# Patient Record
Sex: MALE | Race: ASIAN | NOT HISPANIC OR LATINO | Employment: PART TIME | URBAN - METROPOLITAN AREA
[De-identification: names, ages, dates, MRNs, and addresses within clinical notes are randomized per-mention and may not be internally consistent; named-entity substitution may affect disease eponyms.]

---

## 2022-04-06 ENCOUNTER — TELEPHONE (OUTPATIENT)
Dept: NEUROSURGERY | Facility: CLINIC | Age: 57
End: 2022-04-06
Payer: MEDICAID

## 2022-04-06 NOTE — TELEPHONE ENCOUNTER
Spoke with patient and confirmed appt date and time with Ivan. Also reiterated that he needs to bring imaging on disk to appointment.

## 2022-04-06 NOTE — TELEPHONE ENCOUNTER
----- Message from Norbert Tejada MA sent at 4/5/2022  2:42 PM CDT -----    ----- Message -----  From: Festus Song  Sent: 4/5/2022   2:03 PM CDT  To: Lizeth BECERRIL Staff    Pt is being referred for lumbar radiculopathy. I have scanned the referral/records into media mgr within Epic. Please review and contact pt for scheduling,thanks

## 2022-04-26 ENCOUNTER — OFFICE VISIT (OUTPATIENT)
Dept: NEUROSURGERY | Facility: CLINIC | Age: 57
End: 2022-04-26
Payer: MEDICAID

## 2022-04-26 VITALS
WEIGHT: 175 LBS | SYSTOLIC BLOOD PRESSURE: 131 MMHG | HEIGHT: 70 IN | HEART RATE: 72 BPM | BODY MASS INDEX: 25.05 KG/M2 | DIASTOLIC BLOOD PRESSURE: 69 MMHG

## 2022-04-26 DIAGNOSIS — M54.16 LUMBAR RADICULOPATHY: Primary | ICD-10-CM

## 2022-04-26 PROCEDURE — 99213 OFFICE O/P EST LOW 20 MIN: CPT | Mod: PBBFAC | Performed by: PHYSICIAN ASSISTANT

## 2022-04-26 PROCEDURE — 3078F DIAST BP <80 MM HG: CPT | Mod: CPTII,,, | Performed by: PHYSICIAN ASSISTANT

## 2022-04-26 PROCEDURE — 99204 PR OFFICE/OUTPT VISIT, NEW, LEVL IV, 45-59 MIN: ICD-10-PCS | Mod: S$PBB,,, | Performed by: PHYSICIAN ASSISTANT

## 2022-04-26 PROCEDURE — 1160F PR REVIEW ALL MEDS BY PRESCRIBER/CLIN PHARMACIST DOCUMENTED: ICD-10-PCS | Mod: CPTII,,, | Performed by: PHYSICIAN ASSISTANT

## 2022-04-26 PROCEDURE — 3075F SYST BP GE 130 - 139MM HG: CPT | Mod: CPTII,,, | Performed by: PHYSICIAN ASSISTANT

## 2022-04-26 PROCEDURE — 99999 PR PBB SHADOW E&M-EST. PATIENT-LVL III: CPT | Mod: PBBFAC,,, | Performed by: PHYSICIAN ASSISTANT

## 2022-04-26 PROCEDURE — 99204 OFFICE O/P NEW MOD 45 MIN: CPT | Mod: S$PBB,,, | Performed by: PHYSICIAN ASSISTANT

## 2022-04-26 PROCEDURE — 4010F PR ACE/ARB THEARPY RXD/TAKEN: ICD-10-PCS | Mod: CPTII,,, | Performed by: PHYSICIAN ASSISTANT

## 2022-04-26 PROCEDURE — 3008F BODY MASS INDEX DOCD: CPT | Mod: CPTII,,, | Performed by: PHYSICIAN ASSISTANT

## 2022-04-26 PROCEDURE — 1159F MED LIST DOCD IN RCRD: CPT | Mod: CPTII,,, | Performed by: PHYSICIAN ASSISTANT

## 2022-04-26 PROCEDURE — 1159F PR MEDICATION LIST DOCUMENTED IN MEDICAL RECORD: ICD-10-PCS | Mod: CPTII,,, | Performed by: PHYSICIAN ASSISTANT

## 2022-04-26 PROCEDURE — 3078F PR MOST RECENT DIASTOLIC BLOOD PRESSURE < 80 MM HG: ICD-10-PCS | Mod: CPTII,,, | Performed by: PHYSICIAN ASSISTANT

## 2022-04-26 PROCEDURE — 3008F PR BODY MASS INDEX (BMI) DOCUMENTED: ICD-10-PCS | Mod: CPTII,,, | Performed by: PHYSICIAN ASSISTANT

## 2022-04-26 PROCEDURE — 3075F PR MOST RECENT SYSTOLIC BLOOD PRESS GE 130-139MM HG: ICD-10-PCS | Mod: CPTII,,, | Performed by: PHYSICIAN ASSISTANT

## 2022-04-26 PROCEDURE — 4010F ACE/ARB THERAPY RXD/TAKEN: CPT | Mod: CPTII,,, | Performed by: PHYSICIAN ASSISTANT

## 2022-04-26 PROCEDURE — 1160F RVW MEDS BY RX/DR IN RCRD: CPT | Mod: CPTII,,, | Performed by: PHYSICIAN ASSISTANT

## 2022-04-26 PROCEDURE — 99999 PR PBB SHADOW E&M-EST. PATIENT-LVL III: ICD-10-PCS | Mod: PBBFAC,,, | Performed by: PHYSICIAN ASSISTANT

## 2022-04-26 RX ORDER — MELOXICAM 15 MG/1
15 TABLET ORAL DAILY
COMMUNITY
Start: 2022-04-01 | End: 2022-10-11 | Stop reason: SDUPTHER

## 2022-04-26 RX ORDER — NAPROXEN 500 MG/1
500 TABLET ORAL 2 TIMES DAILY PRN
COMMUNITY
Start: 2022-01-21 | End: 2022-10-11 | Stop reason: ALTCHOICE

## 2022-04-26 RX ORDER — AMLODIPINE AND BENAZEPRIL HYDROCHLORIDE 10; 20 MG/1; MG/1
1 CAPSULE ORAL DAILY
COMMUNITY
Start: 2022-04-14

## 2022-04-26 RX ORDER — GABAPENTIN 300 MG/1
300 CAPSULE ORAL 3 TIMES DAILY
COMMUNITY
Start: 2022-04-14 | End: 2022-05-17

## 2022-04-26 RX ORDER — ATORVASTATIN CALCIUM 10 MG/1
10 TABLET, FILM COATED ORAL NIGHTLY
COMMUNITY
Start: 2022-04-14

## 2022-04-26 RX ORDER — METHOCARBAMOL 750 MG/1
750 TABLET, FILM COATED ORAL 4 TIMES DAILY PRN
Qty: 40 TABLET | Refills: 0 | Status: SHIPPED | OUTPATIENT
Start: 2022-04-26 | End: 2022-05-17

## 2022-04-26 RX ORDER — TRAMADOL HYDROCHLORIDE 50 MG/1
50 TABLET ORAL 3 TIMES DAILY PRN
Status: ON HOLD | COMMUNITY
Start: 2022-04-01 | End: 2023-04-24 | Stop reason: HOSPADM

## 2022-04-26 NOTE — PROGRESS NOTES
Neurosurgery  History & Physical    SUBJECTIVE:     Chief Complaint: Lumbar disc disease    History of Present Illness:  57-year-old male who presents for neurosurgical consultation and evaluation of his lower back.  Patient states that he has been dealing with back pain and bilateral lower extremity pain and paresthesias that travel down the back of his leg and stops the.  Patient has been treated by his primary care physician he has had a round of physical therapy that has helped some but is not eliminate his back pain or is pain in his legs.  He had lower extremity ultrasound of the bilateral arteries and there was no appreciable ischemic peripheral artery disease.  He has been taking gabapentin 600 mg twice a day and nsaids prn.  Denies any weakness in his lower extremities.  Denies bowel bladder dysfunction.    Review of patient's allergies indicates:  No Known Allergies    Current Outpatient Medications   Medication Sig Dispense Refill    amlodipine-benazepril 10-20mg (LOTREL) 10-20 mg per capsule Take 1 capsule by mouth once daily.      atorvastatin (LIPITOR) 10 MG tablet Take 10 mg by mouth nightly.      gabapentin (NEURONTIN) 300 MG capsule Take 300 mg by mouth 3 (three) times daily.      meloxicam (MOBIC) 15 MG tablet Take 15 mg by mouth once daily.      naproxen (NAPROSYN) 500 MG tablet Take 500 mg by mouth 2 (two) times daily as needed.      traMADoL (ULTRAM) 50 mg tablet Take 50 mg by mouth 3 (three) times daily as needed.      methocarbamoL (ROBAXIN) 750 MG Tab Take 1 tablet (750 mg total) by mouth 4 (four) times daily as needed (muscle spasm). 40 tablet 0     No current facility-administered medications for this visit.       Past Medical History:   Diagnosis Date    Hyperlipidemia     Hypertension      History reviewed. No pertinent surgical history.  Family History    None       Social History     Socioeconomic History    Marital status: Unknown   Tobacco Use    Smoking status: Never  "Smoker    Smokeless tobacco: Never Used       Review of Systems  Constitutional: no fever or chills  Eyes: no visual changes  ENT: no nasal congestion or sore throat  Respiratory: no cough or shortness of breath  Cardiovascular: no chest pain or palpitations  Gastrointestinal: no nausea or vomiting  Genitourinary: no hematuria or dysuria  Integument/Breast: no rash or pruritis  Hematologic/Lymphatic: no easy bruising or lymphadenopathy  Musculoskeletal: no arthralgias or myalgias  Neurological: no seizures or tremors    OBJECTIVE:     Vital Signs  Pulse: 72  BP: 131/69  Pain Score:   4  Height: 5' 10" (177.8 cm)  Weight: 79.4 kg (175 lb)  Body mass index is 25.11 kg/m².      Neurosurgery Physical Exam  General: well developed, well nourished, no distress  Neurologic: Alert and oriented. Thought content appropriate.  GCS: Motor: 6/Verbal: 5/Eyes: 4 GCS Total: 15  Cranial nerves: II-XII grossly intact  Neck: supple, without obvious masses or lesions  Skin: grossly intact in all 4 extremities without obvious rashes or lesions  Gait - normal         Able to walk on heels & toes  Cervical ROM - full  Lumbar ROM - full  Motor Strength: No focal numbness or weakness  Strength  Deltoids Triceps Biceps Wrist Extension Wrist Flexion Hand    Upper: R 5/5 5/5 5/5 5/5 5/5 5/5    L 5/5 5/5 5/5 5/5 5/5 5/5     Iliopsoas Quadriceps Knee  Flexion Tibialis  anterior Gastro- cnemius EHL   Lower: R 5/5 5/5 5/5 5/5 5/5 5/5    L 5/5 5/5 5/5 5/5 5/5 5/5   Sensory: intact to light touch B/L UE and LE  DTR's - 2 + and symmetric in UE and LE  Ankle Clonus - Negative         Babinski  - Negative     SLR - Negative SI Joint tenderness - Negative Pain on Hip ROM - Negative        Diagnostic Results:  Outside x-ray report that was done at [facility] 1/21/[facility] was reviewed today.  Impression showed mild-to-moderate degenerative hypertrophic findings of the lumbar spine including slight retrolisthesis on L2 and L3.  Incidental " findings including slight anterior wedging of the L1 vertebral body and multilevel osteophyte without radiographic evidence of acute compression fracture, spondylolysis or other acute findings within this portion was spine.    ASSESSMENT/PLAN:     58 yo male with with 6 month history of low back pain and bilateral lower extremity paresthesias in the S1 distribution.  X-rays show mild-to-moderate degenerative changes of the lumbar spine with L2-3 retrolisthesis per report.  Patient is neurologically intact without any focal deficits, however he continues to be in significant amount of pain both in his lower back and his lower extremities.  Given this will get an MRI of his lumbar spine.  Patient continue gabapentin 600 mg b.i.d..  Give prescription for Robaxin that he can take as needed for muscle spasms.  Will see him back in the clinic after MRI is complete.  He is encouraged to clinic questions concerns in the meantime.    Jules Love Jr. LAURENT  [facility]  Department of Neurosurgery      Note dictated with voice recognition software, please excuse any grammatical errors.

## 2022-05-17 ENCOUNTER — OFFICE VISIT (OUTPATIENT)
Dept: NEUROSURGERY | Facility: CLINIC | Age: 57
End: 2022-05-17
Payer: MEDICAID

## 2022-05-17 ENCOUNTER — HOSPITAL ENCOUNTER (OUTPATIENT)
Dept: RADIOLOGY | Facility: HOSPITAL | Age: 57
Discharge: HOME | End: 2022-05-17
Attending: PHYSICIAN ASSISTANT
Payer: MEDICAID

## 2022-05-17 VITALS
WEIGHT: 175 LBS | BODY MASS INDEX: 25.05 KG/M2 | SYSTOLIC BLOOD PRESSURE: 124 MMHG | HEART RATE: 80 BPM | HEIGHT: 70 IN | DIASTOLIC BLOOD PRESSURE: 77 MMHG

## 2022-05-17 DIAGNOSIS — M54.16 LUMBAR RADICULOPATHY: ICD-10-CM

## 2022-05-17 DIAGNOSIS — M48.062 LUMBAR STENOSIS WITH NEUROGENIC CLAUDICATION: Primary | ICD-10-CM

## 2022-05-17 PROCEDURE — 99213 PR OFFICE/OUTPT VISIT, EST, LEVL III, 20-29 MIN: ICD-10-PCS | Mod: S$PBB,,, | Performed by: PHYSICIAN ASSISTANT

## 2022-05-17 PROCEDURE — 99999 PR PBB SHADOW E&M-EST. PATIENT-LVL III: CPT | Mod: PBBFAC,,, | Performed by: PHYSICIAN ASSISTANT

## 2022-05-17 PROCEDURE — 3074F SYST BP LT 130 MM HG: CPT | Mod: CPTII,,, | Performed by: PHYSICIAN ASSISTANT

## 2022-05-17 PROCEDURE — 72148 MRI LUMBAR SPINE W/O DYE: CPT | Mod: TC,PO

## 2022-05-17 PROCEDURE — 3008F PR BODY MASS INDEX (BMI) DOCUMENTED: ICD-10-PCS | Mod: CPTII,,, | Performed by: PHYSICIAN ASSISTANT

## 2022-05-17 PROCEDURE — 3078F PR MOST RECENT DIASTOLIC BLOOD PRESSURE < 80 MM HG: ICD-10-PCS | Mod: CPTII,,, | Performed by: PHYSICIAN ASSISTANT

## 2022-05-17 PROCEDURE — 4010F ACE/ARB THERAPY RXD/TAKEN: CPT | Mod: CPTII,,, | Performed by: PHYSICIAN ASSISTANT

## 2022-05-17 PROCEDURE — 4010F PR ACE/ARB THEARPY RXD/TAKEN: ICD-10-PCS | Mod: CPTII,,, | Performed by: PHYSICIAN ASSISTANT

## 2022-05-17 PROCEDURE — 3078F DIAST BP <80 MM HG: CPT | Mod: CPTII,,, | Performed by: PHYSICIAN ASSISTANT

## 2022-05-17 PROCEDURE — 3008F BODY MASS INDEX DOCD: CPT | Mod: CPTII,,, | Performed by: PHYSICIAN ASSISTANT

## 2022-05-17 PROCEDURE — 99213 OFFICE O/P EST LOW 20 MIN: CPT | Mod: PBBFAC,25 | Performed by: PHYSICIAN ASSISTANT

## 2022-05-17 PROCEDURE — 3074F PR MOST RECENT SYSTOLIC BLOOD PRESSURE < 130 MM HG: ICD-10-PCS | Mod: CPTII,,, | Performed by: PHYSICIAN ASSISTANT

## 2022-05-17 PROCEDURE — 99999 PR PBB SHADOW E&M-EST. PATIENT-LVL III: ICD-10-PCS | Mod: PBBFAC,,, | Performed by: PHYSICIAN ASSISTANT

## 2022-05-17 PROCEDURE — 72148 MRI LUMBAR SPINE W/O DYE: CPT | Mod: 26,,, | Performed by: RADIOLOGY

## 2022-05-17 PROCEDURE — 99213 OFFICE O/P EST LOW 20 MIN: CPT | Mod: S$PBB,,, | Performed by: PHYSICIAN ASSISTANT

## 2022-05-17 PROCEDURE — 72148 MRI LUMBAR SPINE WITHOUT CONTRAST: ICD-10-PCS | Mod: 26,,, | Performed by: RADIOLOGY

## 2022-05-17 RX ORDER — GABAPENTIN 800 MG/1
800 TABLET ORAL 3 TIMES DAILY
Qty: 90 TABLET | Refills: 3 | Status: SHIPPED | OUTPATIENT
Start: 2022-05-17 | End: 2022-10-11 | Stop reason: ALTCHOICE

## 2022-05-17 NOTE — PROGRESS NOTES
Neurosurgery  Established Patient    SUBJECTIVE:     Interval History:  Mr. Valverde is here today for f/u with MRI Lumbar spine.  He reports with continued bilateral lower extremity paresthesias down the posterior aspect of his leg as well as that anterior medial aspect.  He states that the symptoms continue to be worse when he is up walking around and subside with rest.  Denies any weakness.  Denies bowel bladder dysfunction.  His back pain overall is minimal at this time.    History of Present Illness (4/26/22):  57-year-old male who presents for neurosurgical consultation and evaluation of his lower back.  Patient states that he has been dealing with back pain and bilateral lower extremity pain and paresthesias that travel down the back of his leg and stops the.  Patient has been treated by his primary care physician he has had a round of physical therapy that has helped some but is not eliminate his back pain or is pain in his legs.  He had lower extremity ultrasound of the bilateral arteries and there was no appreciable ischemic peripheral artery disease.  He has been taking gabapentin 600 mg twice a day and nsaids prn.  Denies any weakness in his lower extremities.  Denies bowel bladder dysfunction.        Review of patient's allergies indicates:  No Known Allergies    Current Outpatient Medications   Medication Sig Dispense Refill    amlodipine-benazepril 10-20mg (LOTREL) 10-20 mg per capsule Take 1 capsule by mouth once daily.      atorvastatin (LIPITOR) 10 MG tablet Take 10 mg by mouth nightly.      meloxicam (MOBIC) 15 MG tablet Take 15 mg by mouth once daily.      traMADoL (ULTRAM) 50 mg tablet Take 50 mg by mouth 3 (three) times daily as needed.      gabapentin (NEURONTIN) 800 MG tablet Take 1 tablet (800 mg total) by mouth 3 (three) times daily. 90 tablet 3    naproxen (NAPROSYN) 500 MG tablet Take 500 mg by mouth 2 (two) times daily as needed.       No current facility-administered medications for  "this visit.       Past Medical History:   Diagnosis Date    Hyperlipidemia     Hypertension      History reviewed. No pertinent surgical history.  Family History    None       Social History     Socioeconomic History    Marital status: Unknown   Tobacco Use    Smoking status: Never Smoker    Smokeless tobacco: Never Used       Review of Systems  Constitutional: no fever or chills  Eyes: no visual changes  ENT: no nasal congestion or sore throat  Respiratory: no cough or shortness of breath  Cardiovascular: no chest pain or palpitations  Gastrointestinal: no nausea or vomiting  Genitourinary: no hematuria or dysuria  Integument/Breast: no rash or pruritis  Hematologic/Lymphatic: no easy bruising or lymphadenopathy  Musculoskeletal: no arthralgias or myalgias  Neurological: no seizures or tremors    OBJECTIVE:     Vital Signs  Pulse: 80  BP: 124/77  Pain Score: 10-Worst pain ever  Height: 5' 10" (177.8 cm)  Weight: 79.4 kg (175 lb)  Body mass index is 25.11 kg/m².    Neurosurgery Physical Exam  General: no distress  Neurologic: Alert and oriented. Thought content appropriate.  Head: normocephalic  GCS: Motor: 6/Verbal: 5/Eyes: 4 GCS Total: 15  Cranial nerves: face symmetric, tongue midline, pupils equal, round, reactive to light with accomodation, extraocular muscles intact  Sensory: response to light touch throughout  Motor Strength:full strength upper and lower extremities  Pronator Drift: no drift noted  Finger to nose normal  No focal numbness or weakness  Lungs:  normal respiratory effort  Abdomen: soft, non-tender   Extremities: no cyanosis or edema, or clubbing      Diagnostic Results: personally reviewed  Narrative & Impression  EXAMINATION:  MRI LUMBAR SPINE WITHOUT CONTRAST     CLINICAL HISTORY:  lumbar radiculopathy; Radiculopathy, lumbar region     TECHNIQUE:  Multiplanar, multisequence MR images were acquired from the thoracolumbar junction to the sacrum without the administration of " contrast.     COMPARISON:  None.     FINDINGS:  Lower dorsal spinal cord normal.  No fracture subluxation.  Marrow space benign.  Bridging anterior osteophytes particularly L1 through L4 disc levels.     Included T11, T12, L1, L2 disc levels show no unusual finding.     L 3 L4 minimal mild posterior disc bulge, prominent facet joint and ligamentum flavum hypertrophy with narrowing of lateral recesses, severe spinal and moderate foramen stenosis.     L4-5 moderate posterior disc bulge, posterior central annular fissure without evidence of superimposed central left paracentral disc prolapse, significant compression narrowing lateral recesses particularly on left, prompt facet joint arthropathy, hypertrophy ligamentum flavum, left synovial facet cyst of 2 x 6 mm size, severe spinal and mild moderate foramen stenosis.     Minimal mild posterior central bulge L5-S1 with posterior central annular fissure, slight compression right S1 nerve root question.  Facet joint arthropathy with mild spinal and foramen stenosis.     Impression:     Degenerative disc, spondylosis, facet joint arthropathy particularly L3 and L4 levels discussed above with severe spinal stenosis.        Electronically signed by: Black Morton MD  Date:                                            05/17/2022  Time:                                           10:29    ASSESSMENT/PLAN:     57-year-old male with disc herniation at L4-5 and central canal stenosis contributing to neurogenic claudication.  Patient is neurologically intact without focal deficits.  He does have continued significant paresthesias in his lower extremities.  He has tried 10 weeks of physical therapy, anti-inflammatories, muscle relaxers, and gabapentin without substantial relief.  Intralaminar epidural steroid injection at L4-5 ordered with pain management at [facility].  Will see him back in clinic after this is done.  If he has any questions or concerns the meantime he is encouraged  to call.      Jules Love Jr. PA-C  [facility]  Department of Neurosurgery      Note dictated with voice recognition software, please excuse any grammatical errors.

## 2022-05-19 ENCOUNTER — TELEPHONE (OUTPATIENT)
Dept: ADMINISTRATIVE | Facility: OTHER | Age: 57
End: 2022-05-19
Payer: MEDICAID

## 2022-05-24 ENCOUNTER — HOSPITAL ENCOUNTER (OUTPATIENT)
Facility: OTHER | Age: 57
Discharge: HOME | End: 2022-05-24
Attending: ANESTHESIOLOGY | Admitting: ANESTHESIOLOGY
Payer: MEDICAID

## 2022-05-24 VITALS
BODY MASS INDEX: 25.92 KG/M2 | RESPIRATION RATE: 16 BRPM | WEIGHT: 175 LBS | TEMPERATURE: 98 F | HEIGHT: 69 IN | DIASTOLIC BLOOD PRESSURE: 75 MMHG | HEART RATE: 56 BPM | SYSTOLIC BLOOD PRESSURE: 137 MMHG | OXYGEN SATURATION: 96 %

## 2022-05-24 DIAGNOSIS — M51.36 DDD (DEGENERATIVE DISC DISEASE), LUMBAR: Primary | ICD-10-CM

## 2022-05-24 DIAGNOSIS — M54.17 LUMBOSACRAL RADICULOPATHY: ICD-10-CM

## 2022-05-24 PROCEDURE — 62323 NJX INTERLAMINAR LMBR/SAC: CPT | Mod: ,,, | Performed by: ANESTHESIOLOGY

## 2022-05-24 PROCEDURE — 25500020 PHARM REV CODE 255: Performed by: ANESTHESIOLOGY

## 2022-05-24 PROCEDURE — A4216 STERILE WATER/SALINE, 10 ML: HCPCS | Performed by: ANESTHESIOLOGY

## 2022-05-24 PROCEDURE — 62323 NJX INTERLAMINAR LMBR/SAC: CPT | Performed by: ANESTHESIOLOGY

## 2022-05-24 PROCEDURE — 62323 PR INJ LUMBAR/SACRAL, W/IMAGING GUIDANCE: ICD-10-PCS | Mod: ,,, | Performed by: ANESTHESIOLOGY

## 2022-05-24 PROCEDURE — 25000003 PHARM REV CODE 250: Performed by: STUDENT IN AN ORGANIZED HEALTH CARE EDUCATION/TRAINING PROGRAM

## 2022-05-24 PROCEDURE — 25000003 PHARM REV CODE 250: Performed by: ANESTHESIOLOGY

## 2022-05-24 PROCEDURE — 63600175 PHARM REV CODE 636 W HCPCS: Performed by: ANESTHESIOLOGY

## 2022-05-24 RX ORDER — DEXAMETHASONE SODIUM PHOSPHATE 10 MG/ML
INJECTION INTRAMUSCULAR; INTRAVENOUS
Status: DISCONTINUED | OUTPATIENT
Start: 2022-05-24 | End: 2022-05-24 | Stop reason: HOSPADM

## 2022-05-24 RX ORDER — SODIUM CHLORIDE 9 MG/ML
INJECTION, SOLUTION INTRAMUSCULAR; INTRAVENOUS; SUBCUTANEOUS
Status: DISCONTINUED | OUTPATIENT
Start: 2022-05-24 | End: 2022-05-24 | Stop reason: HOSPADM

## 2022-05-24 RX ORDER — LIDOCAINE HYDROCHLORIDE 10 MG/ML
INJECTION, SOLUTION EPIDURAL; INFILTRATION; INTRACAUDAL; PERINEURAL
Status: DISCONTINUED | OUTPATIENT
Start: 2022-05-24 | End: 2022-05-24 | Stop reason: HOSPADM

## 2022-05-24 RX ORDER — LIDOCAINE HYDROCHLORIDE 20 MG/ML
INJECTION, SOLUTION INFILTRATION; PERINEURAL
Status: DISCONTINUED | OUTPATIENT
Start: 2022-05-24 | End: 2022-05-24 | Stop reason: HOSPADM

## 2022-05-24 RX ORDER — FENTANYL CITRATE 50 UG/ML
INJECTION, SOLUTION INTRAMUSCULAR; INTRAVENOUS
Status: DISCONTINUED | OUTPATIENT
Start: 2022-05-24 | End: 2022-05-24 | Stop reason: HOSPADM

## 2022-05-24 RX ORDER — MIDAZOLAM HYDROCHLORIDE 1 MG/ML
INJECTION INTRAMUSCULAR; INTRAVENOUS
Status: DISCONTINUED | OUTPATIENT
Start: 2022-05-24 | End: 2022-05-24 | Stop reason: HOSPADM

## 2022-05-24 RX ORDER — SODIUM CHLORIDE 9 MG/ML
500 INJECTION, SOLUTION INTRAVENOUS CONTINUOUS
Status: DISCONTINUED | OUTPATIENT
Start: 2022-05-24 | End: 2022-05-24 | Stop reason: HOSPADM

## 2022-05-24 NOTE — DISCHARGE SUMMARY
[facility] ([facility])  Discharge Note  Short Stay    Procedure(s) (LRB):  INJECTION, STEROID, EPIDURAL, L5/S1 IL CONTRAST DIRECT REF (N/A)    OUTCOME: Patient tolerated treatment/procedure well without complication and is now ready for discharge.    DISPOSITION: Home or Self Care    FINAL DIAGNOSIS:  Lumbar radiculopathy    FOLLOWUP: In clinic    DISCHARGE INSTRUCTIONS:  No discharge procedures on file.     TIME SPENT ON DISCHARGE: 2 minutes

## 2022-05-24 NOTE — H&P
HPI  Patient presenting for Procedure(s) (LRB):  INJECTION, STEROID, EPIDURAL, L4-L5 IL CONTRAST DIRECT REF (N/A)     Patient on Anti-coagulation No    No health changes since previous encounter    Past Medical History:   Diagnosis Date    Hyperlipidemia     Hypertension      No past surgical history on file.  Review of patient's allergies indicates:  No Known Allergies   No current facility-administered medications for this encounter.       PMHx, PSHx, Allergies, Medications reviewed in epic    ROS negative except pain complaints in HPI    OBJECTIVE:    There were no vitals taken for this visit.    PHYSICAL EXAMINATION:    GENERAL: Well appearing, in no acute distress, alert and oriented x3.  PSYCH:  Mood and affect appropriate.  SKIN: Skin color, texture, turgor normal, no rashes or lesions which will impact the procedure.  CV: RRR with palpation of the radial artery.  PULM: No evidence of respiratory difficulty, symmetric chest rise. Clear to auscultation.  NEURO: Cranial nerves grossly intact.    Plan:    Proceed with procedure as planned Procedure(s) (LRB):  INJECTION, STEROID, EPIDURAL, L4-L5 IL CONTRAST DIRECT REF (N/A)    Alvin Moe  05/24/2022

## 2022-05-24 NOTE — OP NOTE
Lumbar Interlaminar Epidural Steroid Injection under Fluoroscopic Guidance    The procedure, risks, benefits, and options were discussed with the patient. There are no contraindications to the procedure. The patent expressed understanding and agreed to the procedure. Informed written consent was obtained prior to the start of the procedure and can be found in the patient's chart.    PATIENT NAME: Mr. Juanita Valverde   MRN: [id number]     DATE OF PROCEDURE: 05/24/2022    PROCEDURE: Lumbar Interlaminar Epidural Steroid Injection L5/S1 under Fluoroscopic Guidance    PRE-OP DIAGNOSIS: Lumbar stenosis with neurogenic claudication [M48.062] Lumbar radiculopathy [M54.16]    POST-OP DIAGNOSIS: Same    PHYSICIAN: Angelica Pedraza MD    ASSISTANTS: none     MEDICATIONS INJECTED: Preservative-free Decadron 10mg with 4cc of Lidocaine 1% MPF and preservative free normal saline    LOCAL ANESTHETIC INJECTED: Xylocaine 2%     ANXIOLYSIS: Versed 1mg and fentanyl 50MCG    ESTIMATED BLOOD LOSS: None    COMPLICATIONS: None    TECHNIQUE: Time-out was performed to identify the patient and procedure to be performed. With the patient laying in a prone position, the surgical area was prepped and draped in the usual sterile fashion using ChloraPrep and a fenestrated drape. The level was determined under fluoroscopy guidance. Skin anesthesia was achieved by injecting Lidocaine 2% over the injection site. The interlaminar space was then approached with a 18 gauge,  3.5 inch Tuohy needle that was introduced under fluoroscopic guidance in the AP, lateral and/or contralateral oblique imaging. Once the Ligamentum flavum was encountered loss of resistance to air was used to enter the epidural space. With positive loss of resistance and negative aspiration for CSF or Blood, contrast dye Omnipaque (240mg/mL) was injected to confirm placement and there was no vascular runoff. 4 mL of the medication mixture listed above was injected slowly. Displacement of  the radio opaque contrast after injection of the medication confirmed that the medication went into the area of the epidural space. The needles were removed and bleeding was nil. A sterile dressing was applied. No specimens collected. The patient tolerated the procedure well.       The patient was monitored after the procedure in the recovery area. They were given post-procedure and discharge instructions to follow at home. The patient was discharged in a stable condition.    Angelica Pedraza MD

## 2022-05-24 NOTE — DISCHARGE INSTRUCTIONS

## 2022-07-12 ENCOUNTER — OFFICE VISIT (OUTPATIENT)
Dept: NEUROSURGERY | Facility: CLINIC | Age: 57
End: 2022-07-12
Payer: MEDICAID

## 2022-07-12 VITALS
HEIGHT: 69 IN | SYSTOLIC BLOOD PRESSURE: 128 MMHG | HEART RATE: 67 BPM | BODY MASS INDEX: 25.92 KG/M2 | WEIGHT: 175 LBS | DIASTOLIC BLOOD PRESSURE: 82 MMHG

## 2022-07-12 DIAGNOSIS — M48.062 LUMBAR STENOSIS WITH NEUROGENIC CLAUDICATION: Primary | ICD-10-CM

## 2022-07-12 PROCEDURE — 3008F PR BODY MASS INDEX (BMI) DOCUMENTED: ICD-10-PCS | Mod: CPTII,,, | Performed by: PHYSICIAN ASSISTANT

## 2022-07-12 PROCEDURE — 4010F PR ACE/ARB THEARPY RXD/TAKEN: ICD-10-PCS | Mod: CPTII,,, | Performed by: PHYSICIAN ASSISTANT

## 2022-07-12 PROCEDURE — 3074F PR MOST RECENT SYSTOLIC BLOOD PRESSURE < 130 MM HG: ICD-10-PCS | Mod: CPTII,,, | Performed by: PHYSICIAN ASSISTANT

## 2022-07-12 PROCEDURE — 1160F RVW MEDS BY RX/DR IN RCRD: CPT | Mod: CPTII,,, | Performed by: PHYSICIAN ASSISTANT

## 2022-07-12 PROCEDURE — 1160F PR REVIEW ALL MEDS BY PRESCRIBER/CLIN PHARMACIST DOCUMENTED: ICD-10-PCS | Mod: CPTII,,, | Performed by: PHYSICIAN ASSISTANT

## 2022-07-12 PROCEDURE — 99213 OFFICE O/P EST LOW 20 MIN: CPT | Mod: S$PBB,,, | Performed by: PHYSICIAN ASSISTANT

## 2022-07-12 PROCEDURE — 99213 PR OFFICE/OUTPT VISIT, EST, LEVL III, 20-29 MIN: ICD-10-PCS | Mod: S$PBB,,, | Performed by: PHYSICIAN ASSISTANT

## 2022-07-12 PROCEDURE — 99213 OFFICE O/P EST LOW 20 MIN: CPT | Mod: PBBFAC | Performed by: PHYSICIAN ASSISTANT

## 2022-07-12 PROCEDURE — 99999 PR PBB SHADOW E&M-EST. PATIENT-LVL III: CPT | Mod: PBBFAC,,, | Performed by: PHYSICIAN ASSISTANT

## 2022-07-12 PROCEDURE — 3079F PR MOST RECENT DIASTOLIC BLOOD PRESSURE 80-89 MM HG: ICD-10-PCS | Mod: CPTII,,, | Performed by: PHYSICIAN ASSISTANT

## 2022-07-12 PROCEDURE — 1159F PR MEDICATION LIST DOCUMENTED IN MEDICAL RECORD: ICD-10-PCS | Mod: CPTII,,, | Performed by: PHYSICIAN ASSISTANT

## 2022-07-12 PROCEDURE — 99999 PR PBB SHADOW E&M-EST. PATIENT-LVL III: ICD-10-PCS | Mod: PBBFAC,,, | Performed by: PHYSICIAN ASSISTANT

## 2022-07-12 PROCEDURE — 3074F SYST BP LT 130 MM HG: CPT | Mod: CPTII,,, | Performed by: PHYSICIAN ASSISTANT

## 2022-07-12 PROCEDURE — 3079F DIAST BP 80-89 MM HG: CPT | Mod: CPTII,,, | Performed by: PHYSICIAN ASSISTANT

## 2022-07-12 PROCEDURE — 4010F ACE/ARB THERAPY RXD/TAKEN: CPT | Mod: CPTII,,, | Performed by: PHYSICIAN ASSISTANT

## 2022-07-12 PROCEDURE — 1159F MED LIST DOCD IN RCRD: CPT | Mod: CPTII,,, | Performed by: PHYSICIAN ASSISTANT

## 2022-07-12 PROCEDURE — 3008F BODY MASS INDEX DOCD: CPT | Mod: CPTII,,, | Performed by: PHYSICIAN ASSISTANT

## 2022-07-12 RX ORDER — TRIAMCINOLONE ACETONIDE 1 MG/G
CREAM TOPICAL 2 TIMES DAILY
COMMUNITY
Start: 2022-05-26 | End: 2023-01-04 | Stop reason: CLARIF

## 2022-07-12 RX ORDER — HYDROCORTISONE 25 MG/G
CREAM TOPICAL 2 TIMES DAILY
COMMUNITY
Start: 2022-05-26 | End: 2023-01-04 | Stop reason: CLARIF

## 2022-07-12 NOTE — PROGRESS NOTES
Neurosurgery  Established Patient    SUBJECTIVE:     Interval History:  Mr. Valverde is here today for follow-up after epidural steroid injection on 05/24/2022.  Patient reports significant improvement in pain and paresthesias of lower extremities, he states approximate 40% improvement.  He feels good about his pain level at this point in time.  He is able to do a lot of the activities that he was unable to do prior to the injection.  He denies any weakness in the lower extremities.  He denies bowel bladder dysfunction.    Interval History(5/17/22):  Mr. Valverde is here today for f/u with MRI Lumbar spine.  He reports with continued bilateral lower extremity paresthesias down the posterior aspect of his leg as well as that anterior medial aspect.  He states that the symptoms continue to be worse when he is up walking around and subside with rest.  Denies any weakness.  Denies bowel bladder dysfunction.  His back pain overall is minimal at this time.     History of Present Illness (4/26/22):  57-year-old male who presents for neurosurgical consultation and evaluation of his lower back.  Patient states that he has been dealing with back pain and bilateral lower extremity pain and paresthesias that travel down the back of his leg and stops the.  Patient has been treated by his primary care physician he has had a round of physical therapy that has helped some but is not eliminate his back pain or is pain in his legs.  He had lower extremity ultrasound of the bilateral arteries and there was no appreciable ischemic peripheral artery disease.  He has been taking gabapentin 600 mg twice a day and nsaids prn.  Denies any weakness in his lower extremities.  Denies bowel bladder dysfunction.      Review of patient's allergies indicates:  No Known Allergies    Current Outpatient Medications   Medication Sig Dispense Refill    amlodipine-benazepril 10-20mg (LOTREL) 10-20 mg per capsule Take 1 capsule by mouth once daily.       "atorvastatin (LIPITOR) 10 MG tablet Take 10 mg by mouth nightly.      gabapentin (NEURONTIN) 800 MG tablet Take 1 tablet (800 mg total) by mouth 3 (three) times daily. 90 tablet 3    hydrocortisone 2.5 % cream Apply topically 2 (two) times daily.      meloxicam (MOBIC) 15 MG tablet Take 15 mg by mouth once daily.      naproxen (NAPROSYN) 500 MG tablet Take 500 mg by mouth 2 (two) times daily as needed.      traMADoL (ULTRAM) 50 mg tablet Take 50 mg by mouth 3 (three) times daily as needed.      triamcinolone acetonide 0.1% (KENALOG) 0.1 % cream Apply topically 2 (two) times daily.       No current facility-administered medications for this visit.       Past Medical History:   Diagnosis Date    Hyperlipidemia     Hypertension      Past Surgical History:   Procedure Laterality Date    EPIDURAL STEROID INJECTION N/A 5/24/2022    Procedure: INJECTION, STEROID, EPIDURAL, L5/S1 IL CONTRAST DIRECT REF;  Surgeon: Angelica Pedraza MD;  Location: [facility];  Service: Pain Management;  Laterality: N/A;     Family History    None       Social History     Socioeconomic History    Marital status: Unknown   Tobacco Use    Smoking status: Never Smoker    Smokeless tobacco: Never Used       Review of Systems  Constitutional: no fever or chills  Eyes: no visual changes  ENT: no nasal congestion or sore throat  Respiratory: no cough or shortness of breath  Cardiovascular: no chest pain or palpitations  Gastrointestinal: no nausea or vomiting  Genitourinary: no hematuria or dysuria  Integument/Breast: no rash or pruritis  Hematologic/Lymphatic: no easy bruising or lymphadenopathy  Musculoskeletal: no arthralgias or myalgias  Neurological: no seizures or tremors    OBJECTIVE:     Vital Signs  Pulse: 67  BP: 128/82  Pain Score:   6  Height: 5' 9" (175.3 cm)  Weight: 79.4 kg (175 lb)  Body mass index is 25.84 kg/m².    Neurosurgery Physical Exam  General: no distress  Neurologic: Alert and oriented. Thought content " appropriate.  Head: normocephalic  GCS: Motor: 6/Verbal: 5/Eyes: 4 GCS Total: 15  Cranial nerves: face symmetric, tongue midline, pupils equal, round, reactive to light with accomodation, extraocular muscles intact  Sensory: response to light touch throughout  Motor Strength:full strength upper and lower extremities  DTRs 2+ and symmetric      Diagnostic Results:  No recent    ASSESSMENT/PLAN:     57-year-old male with disc herniation at L4-5 and central canal stenosis with neurogenic claudication.  Patient marked improvement status post epidural injection approximately since 7 weeks ago.  He is neurologically intact without focal deficits.  Will continue to follow discussed with him if his pain begins to return to give us a call and we can get a repeat injection.  Continue gabapentin he is currently taking 800 mg twice a day.  Will plan on seeing him back in 3 months regardless.  He is encouraged to call the clinic with questions or concerns the meantime.    Jules Love Jr. LAURENT  [facility]  Department of Neurosurgery      Note dictated with voice recognition software, please excuse any grammatical errors.

## 2022-10-11 ENCOUNTER — OFFICE VISIT (OUTPATIENT)
Dept: NEUROSURGERY | Facility: CLINIC | Age: 57
End: 2022-10-11
Payer: MEDICAID

## 2022-10-11 VITALS
HEART RATE: 73 BPM | DIASTOLIC BLOOD PRESSURE: 80 MMHG | HEIGHT: 69 IN | WEIGHT: 175 LBS | BODY MASS INDEX: 25.92 KG/M2 | SYSTOLIC BLOOD PRESSURE: 137 MMHG

## 2022-10-11 DIAGNOSIS — M25.511 RIGHT SHOULDER PAIN, UNSPECIFIED CHRONICITY: ICD-10-CM

## 2022-10-11 DIAGNOSIS — M17.0 PRIMARY OSTEOARTHRITIS OF BOTH KNEES: ICD-10-CM

## 2022-10-11 PROCEDURE — 99213 OFFICE O/P EST LOW 20 MIN: CPT | Mod: PBBFAC | Performed by: PHYSICIAN ASSISTANT

## 2022-10-11 PROCEDURE — 1159F PR MEDICATION LIST DOCUMENTED IN MEDICAL RECORD: ICD-10-PCS | Mod: CPTII,,, | Performed by: PHYSICIAN ASSISTANT

## 2022-10-11 PROCEDURE — 1160F RVW MEDS BY RX/DR IN RCRD: CPT | Mod: CPTII,,, | Performed by: PHYSICIAN ASSISTANT

## 2022-10-11 PROCEDURE — 99214 PR OFFICE/OUTPT VISIT, EST, LEVL IV, 30-39 MIN: ICD-10-PCS | Mod: S$PBB,,, | Performed by: PHYSICIAN ASSISTANT

## 2022-10-11 PROCEDURE — 3075F SYST BP GE 130 - 139MM HG: CPT | Mod: CPTII,,, | Performed by: PHYSICIAN ASSISTANT

## 2022-10-11 PROCEDURE — 3008F BODY MASS INDEX DOCD: CPT | Mod: CPTII,,, | Performed by: PHYSICIAN ASSISTANT

## 2022-10-11 PROCEDURE — 99214 OFFICE O/P EST MOD 30 MIN: CPT | Mod: S$PBB,,, | Performed by: PHYSICIAN ASSISTANT

## 2022-10-11 PROCEDURE — 1159F MED LIST DOCD IN RCRD: CPT | Mod: CPTII,,, | Performed by: PHYSICIAN ASSISTANT

## 2022-10-11 PROCEDURE — 3079F DIAST BP 80-89 MM HG: CPT | Mod: CPTII,,, | Performed by: PHYSICIAN ASSISTANT

## 2022-10-11 PROCEDURE — 3008F PR BODY MASS INDEX (BMI) DOCUMENTED: ICD-10-PCS | Mod: CPTII,,, | Performed by: PHYSICIAN ASSISTANT

## 2022-10-11 PROCEDURE — 99999 PR PBB SHADOW E&M-EST. PATIENT-LVL III: CPT | Mod: PBBFAC,,, | Performed by: PHYSICIAN ASSISTANT

## 2022-10-11 PROCEDURE — 3075F PR MOST RECENT SYSTOLIC BLOOD PRESS GE 130-139MM HG: ICD-10-PCS | Mod: CPTII,,, | Performed by: PHYSICIAN ASSISTANT

## 2022-10-11 PROCEDURE — 3079F PR MOST RECENT DIASTOLIC BLOOD PRESSURE 80-89 MM HG: ICD-10-PCS | Mod: CPTII,,, | Performed by: PHYSICIAN ASSISTANT

## 2022-10-11 PROCEDURE — 1160F PR REVIEW ALL MEDS BY PRESCRIBER/CLIN PHARMACIST DOCUMENTED: ICD-10-PCS | Mod: CPTII,,, | Performed by: PHYSICIAN ASSISTANT

## 2022-10-11 PROCEDURE — 4010F ACE/ARB THERAPY RXD/TAKEN: CPT | Mod: CPTII,,, | Performed by: PHYSICIAN ASSISTANT

## 2022-10-11 PROCEDURE — 99999 PR PBB SHADOW E&M-EST. PATIENT-LVL III: ICD-10-PCS | Mod: PBBFAC,,, | Performed by: PHYSICIAN ASSISTANT

## 2022-10-11 PROCEDURE — 4010F PR ACE/ARB THEARPY RXD/TAKEN: ICD-10-PCS | Mod: CPTII,,, | Performed by: PHYSICIAN ASSISTANT

## 2022-10-11 RX ORDER — GABAPENTIN 300 MG/1
300 CAPSULE ORAL 3 TIMES DAILY
Qty: 90 CAPSULE | Refills: 3 | Status: SHIPPED | OUTPATIENT
Start: 2022-10-11 | End: 2023-06-06 | Stop reason: SDUPTHER

## 2022-10-11 RX ORDER — GABAPENTIN 300 MG/1
300 CAPSULE ORAL 3 TIMES DAILY
COMMUNITY
Start: 2022-08-06 | End: 2022-10-11 | Stop reason: SDUPTHER

## 2022-10-11 RX ORDER — MELOXICAM 15 MG/1
15 TABLET ORAL DAILY PRN
Qty: 30 TABLET | Refills: 0 | Status: SHIPPED | OUTPATIENT
Start: 2022-10-11 | End: 2023-06-06 | Stop reason: SDUPTHER

## 2022-10-11 NOTE — PROGRESS NOTES
Neurosurgery  Established Patient    SUBJECTIVE:     History of Present Illness:  Mr. Valverde is here today for follow up of ongoing bilateral leg pain, L > R. He has not had another injection since last visit.  He reports his leg pain is intermittent, however seems to be occurring more frequently.  Patient also reports noticed some slight foot weakness on the left since last visit.  Denies bowel/bladder dysfunction.  Otherwise no new complaints since last visit.       Interval History(7/12/22):  Mr. Valverde is here today for follow-up after epidural steroid injection on 05/24/2022.  Patient reports significant improvement in pain and paresthesias of lower extremities, he states approximate 40% improvement.  He feels good about his pain level at this point in time.  He is able to do a lot of the activities that he was unable to do prior to the injection.  He denies any weakness in the lower extremities.  He denies bowel bladder dysfunction.     Interval History(5/17/22):  Mr. Valverde is here today for f/u with MRI Lumbar spine.  He reports with continued bilateral lower extremity paresthesias down the posterior aspect of his leg as well as that anterior medial aspect.  He states that the symptoms continue to be worse when he is up walking around and subside with rest.  Denies any weakness.  Denies bowel bladder dysfunction.  His back pain overall is minimal at this time.     History of Present Illness (4/26/22):  57-year-old male who presents for neurosurgical consultation and evaluation of his lower back.  Patient states that he has been dealing with back pain and bilateral lower extremity pain and paresthesias that travel down the back of his leg and stops the.  Patient has been treated by his primary care physician he has had a round of physical therapy that has helped some but is not eliminate his back pain or is pain in his legs.  He had lower extremity ultrasound of the bilateral arteries and there was no appreciable  ischemic peripheral artery disease.  He has been taking gabapentin 600 mg twice a day and nsaids prn.  Denies any weakness in his lower extremities.  Denies bowel bladder dysfunction.    Review of patient's allergies indicates:  No Known Allergies    Current Outpatient Medications   Medication Sig Dispense Refill    amlodipine-benazepril 10-20mg (LOTREL) 10-20 mg per capsule Take 1 capsule by mouth once daily.      atorvastatin (LIPITOR) 10 MG tablet Take 10 mg by mouth nightly.      diclofenac sodium (VOLTAREN) 1 % Gel Apply 2 g topically 3 (three) times daily. 200 g 5    meloxicam (MOBIC) 15 MG tablet Take 1 tablet (15 mg total) by mouth once daily. TAKE WITH FOOD 30 tablet 1    traMADoL (ULTRAM) 50 mg tablet Take 50 mg by mouth 3 (three) times daily as needed.      gabapentin (NEURONTIN) 300 MG capsule Take 1 capsule (300 mg total) by mouth 3 (three) times daily. 90 capsule 3    hydrocortisone 2.5 % cream Apply topically 2 (two) times daily.      meloxicam (MOBIC) 15 MG tablet Take 1 tablet (15 mg total) by mouth daily as needed for Pain (back pain). 30 tablet 0    triamcinolone acetonide 0.1% (KENALOG) 0.1 % cream Apply topically 2 (two) times daily.       No current facility-administered medications for this visit.       Past Medical History:   Diagnosis Date    Hyperlipidemia     Hypertension      Past Surgical History:   Procedure Laterality Date    EPIDURAL STEROID INJECTION N/A 5/24/2022    Procedure: INJECTION, STEROID, EPIDURAL, L5/S1 IL CONTRAST DIRECT REF;  Surgeon: Angelica Pedraza MD;  Location: [facility] PAIN T;  Service: Pain Management;  Laterality: N/A;     Family History    None       Social History     Socioeconomic History    Marital status: Unknown   Tobacco Use    Smoking status: Never    Smokeless tobacco: Never   Substance and Sexual Activity    Alcohol use: Never    Drug use: Never    Sexual activity: Yes     Partners: Male   Social History Narrative    ** Merged History Encounter **       "      Review of Systems  Constitutional: no fever or chills  Eyes: no visual changes  ENT: no nasal congestion or sore throat  Respiratory: no cough or shortness of breath  Cardiovascular: no chest pain or palpitations  Gastrointestinal: no nausea or vomiting  Genitourinary: no hematuria or dysuria  Integument/Breast: no rash or pruritis  Hematologic/Lymphatic: no easy bruising or lymphadenopathy  Musculoskeletal: no arthralgias or myalgias  Neurological: no seizures or tremors    OBJECTIVE:     Vital Signs  Pulse: 73  BP: 137/80  Pain Score:   5  Height: 5' 9" (175.3 cm)  Weight: 79.4 kg (175 lb)  Body mass index is 25.84 kg/m².    Neurosurgery Physical Exam  General: well developed, well nourished, no distress  Neurologic: Alert and oriented. Thought content appropriate.  GCS: Motor: 6/Verbal: 5/Eyes: 4 GCS Total: 15  Cranial nerves: II-XII grossly intact  Neck: supple, without obvious masses or lesions  Skin: grossly intact in all 4 extremities without obvious rashes or lesions  Gait - normal  Tandem Gait - No difficulty         Able to walk on heels & toes  Motor Strength:   Strength  Deltoids Triceps Biceps Wrist Extension Wrist Flexion Hand    Upper: R 5/5 5/5 5/5 5/5 5/5 5/5    L 5/5 5/5 5/5 5/5 5/5 5/5     Iliopsoas Quadriceps Knee  Flexion Tibialis  anterior Gastro- cnemius EHL   Lower: R 5/5 5/5 5/5 5/5 5/5 5/5    L 5/5 5/5 5/5 4+/5 4+/5 4+/5   Sensory: intact to light touch B/L UE and LE  DTR's - 2 + and symmetric in UE and LE  Carroll's - Negative         Ankle Clonus - Negative         Babinski  - Negative         Diagnostic Results: no recent      ASSESSMENT/PLAN:     57-year-old male with disc herniation and synovial cyst at L4-5 resulting central canal stenosis with neurogenic claudication, now with slight left foot weakness compared to right.  Given ongoing symptoms as well as new foot weakness, recommend surgical decompression at L4-5, will have patient follow up with Dr. Monsivais to discuss " surgical options.  In the meantime will order repeat DARIEL, he had a good response to last intralaminar injection at L5-S1.  Refilled gabapentin, taking 300 TID, and mobic prn back pain.  He was encouraged to call the clinic with any questions or concerns in the meantime.       Jules Love Jr. LAURENT  [facility]  Department of Neurosurgery        Note dictated with voice recognition software, please excuse any grammatical errors.

## 2022-10-12 ENCOUNTER — TELEPHONE (OUTPATIENT)
Dept: PAIN MEDICINE | Facility: OTHER | Age: 57
End: 2022-10-12
Payer: MEDICAID

## 2022-10-12 DIAGNOSIS — M48.062 LUMBAR STENOSIS WITH NEUROGENIC CLAUDICATION: Primary | ICD-10-CM

## 2022-10-12 NOTE — TELEPHONE ENCOUNTER
Spoke to patient to schedule direct referral procedure. Patient is scheduled on 10/25/22 at 12:30 PM with Dr. Pedraza. Patient was offered an opportunity to be scheduled in the Pain Management Clinic for a consult with the performing provider before scheduling. Patient declined provider consult. Date, time, and instructions for procedure given to patient. Patient verbalized understanding.

## 2022-10-25 ENCOUNTER — HOSPITAL ENCOUNTER (OUTPATIENT)
Facility: OTHER | Age: 57
Discharge: HOME | End: 2022-10-25
Attending: ANESTHESIOLOGY | Admitting: ANESTHESIOLOGY
Payer: MEDICAID

## 2022-10-25 VITALS
WEIGHT: 175 LBS | TEMPERATURE: 98 F | OXYGEN SATURATION: 97 % | SYSTOLIC BLOOD PRESSURE: 138 MMHG | RESPIRATION RATE: 14 BRPM | HEIGHT: 70 IN | BODY MASS INDEX: 25.05 KG/M2 | HEART RATE: 61 BPM | DIASTOLIC BLOOD PRESSURE: 84 MMHG

## 2022-10-25 DIAGNOSIS — M54.16 LUMBAR RADICULOPATHY: ICD-10-CM

## 2022-10-25 DIAGNOSIS — M51.36 DDD (DEGENERATIVE DISC DISEASE), LUMBAR: Primary | ICD-10-CM

## 2022-10-25 DIAGNOSIS — G89.29 CHRONIC PAIN: ICD-10-CM

## 2022-10-25 PROCEDURE — 25000003 PHARM REV CODE 250: Performed by: ANESTHESIOLOGY

## 2022-10-25 PROCEDURE — 25000003 PHARM REV CODE 250: Performed by: STUDENT IN AN ORGANIZED HEALTH CARE EDUCATION/TRAINING PROGRAM

## 2022-10-25 PROCEDURE — 62323 NJX INTERLAMINAR LMBR/SAC: CPT | Mod: ,,, | Performed by: ANESTHESIOLOGY

## 2022-10-25 PROCEDURE — 63600175 PHARM REV CODE 636 W HCPCS: Performed by: ANESTHESIOLOGY

## 2022-10-25 PROCEDURE — 25500020 PHARM REV CODE 255: Performed by: ANESTHESIOLOGY

## 2022-10-25 PROCEDURE — 62323 NJX INTERLAMINAR LMBR/SAC: CPT | Performed by: ANESTHESIOLOGY

## 2022-10-25 PROCEDURE — 62323 PR INJ LUMBAR/SACRAL, W/IMAGING GUIDANCE: ICD-10-PCS | Mod: ,,, | Performed by: ANESTHESIOLOGY

## 2022-10-25 RX ORDER — MIDAZOLAM HYDROCHLORIDE 1 MG/ML
INJECTION INTRAMUSCULAR; INTRAVENOUS
Status: DISCONTINUED | OUTPATIENT
Start: 2022-10-25 | End: 2022-10-25 | Stop reason: HOSPADM

## 2022-10-25 RX ORDER — LIDOCAINE HYDROCHLORIDE 10 MG/ML
INJECTION, SOLUTION EPIDURAL; INFILTRATION; INTRACAUDAL; PERINEURAL
Status: DISCONTINUED | OUTPATIENT
Start: 2022-10-25 | End: 2022-10-25 | Stop reason: HOSPADM

## 2022-10-25 RX ORDER — LIDOCAINE HYDROCHLORIDE 20 MG/ML
INJECTION, SOLUTION INFILTRATION; PERINEURAL
Status: DISCONTINUED | OUTPATIENT
Start: 2022-10-25 | End: 2022-10-25 | Stop reason: HOSPADM

## 2022-10-25 RX ORDER — SODIUM CHLORIDE 9 MG/ML
500 INJECTION, SOLUTION INTRAVENOUS CONTINUOUS
Status: DISCONTINUED | OUTPATIENT
Start: 2022-10-25 | End: 2022-10-25 | Stop reason: HOSPADM

## 2022-10-25 RX ORDER — DEXAMETHASONE SODIUM PHOSPHATE 10 MG/ML
INJECTION INTRAMUSCULAR; INTRAVENOUS
Status: DISCONTINUED | OUTPATIENT
Start: 2022-10-25 | End: 2022-10-25 | Stop reason: HOSPADM

## 2022-10-25 RX ORDER — FENTANYL CITRATE 50 UG/ML
INJECTION, SOLUTION INTRAMUSCULAR; INTRAVENOUS
Status: DISCONTINUED | OUTPATIENT
Start: 2022-10-25 | End: 2022-10-25 | Stop reason: HOSPADM

## 2022-10-25 NOTE — OP NOTE
Lumbar Interlaminar Epidural Steroid Injection under Fluoroscopic Guidance    The procedure, risks, benefits, and options were discussed with the patient. There are no contraindications to the procedure. The patent expressed understanding and agreed to the procedure. Informed written consent was obtained prior to the start of the procedure and can be found in the patient's chart.    PATIENT NAME: Juanita Valverde   MRN: [id number]     DATE OF PROCEDURE: 10/25/2022    PROCEDURE: Lumbar Interlaminar Epidural Steroid Injection L5/S1 under Fluoroscopic Guidance    PRE-OP DIAGNOSIS: Lumbar stenosis with neurogenic claudication [M48.062] Lumbar radiculopathy [M54.16]    POST-OP DIAGNOSIS: Same    PHYSICIAN: Angelica Pedraza MD    ASSISTANTS: Sam Patricia MD     MEDICATIONS INJECTED: Preservative-free Decadron 10mg with 4cc of Lidocaine 1% MPF and preservative free normal saline    LOCAL ANESTHETIC INJECTED: Xylocaine 2%     SEDATION: Versed 1mg and Fentanyl 50mcg                                                                                                                                                                                     Conscious sedation ordered by M.D. Patient re-evaluation prior to administration of conscious sedation. No changes noted in patient's status from initial evaluation. The patient's vital signs were monitored by RN and patient remained hemodynamically stable throughout the procedure.    Event Time In   Sedation Start 1410   Sedation End 1419       ESTIMATED BLOOD LOSS: None    COMPLICATIONS: None    TECHNIQUE: Time-out was performed to identify the patient and procedure to be performed. With the patient laying in a prone position, the surgical area was prepped and draped in the usual sterile fashion using ChloraPrep and a fenestrated drape. The level was determined under fluoroscopy guidance. Skin anesthesia was achieved by injecting Lidocaine 2% over the injection site. The interlaminar space  was then approached with a 18 gauge,  3.5 inch Tuohy needle that was introduced under fluoroscopic guidance in the AP, lateral and/or contralateral oblique imaging. Once the Ligamentum flavum was encountered loss of resistance to air was used to enter the epidural space. With positive loss of resistance and negative aspiration for CSF or Blood, contrast dye Omnipaque (300mg/mL) was injected to confirm placement and there was no vascular runoff. 5 mL of the medication mixture listed above was injected slowly. Displacement of the radio opaque contrast after injection of the medication confirmed that the medication went into the area of the epidural space. The needles were removed and bleeding was nil. A sterile dressing was applied. No specimens collected. The patient tolerated the procedure well.       The patient was monitored after the procedure in the recovery area. They were given post-procedure and discharge instructions to follow at home. The patient was discharged in a stable condition.      Angelica Pedraza MD

## 2022-10-25 NOTE — DISCHARGE SUMMARY
Discharge Note  Short Stay      SUMMARY     Admit Date: 10/25/2022    Attending Physician: Angelica Pedraza      Discharge Physician: Angelica Pedraza      Discharge Date: 10/25/2022 2:22 PM    Procedure(s) (LRB):  LUMBAR L5/S1 IL DARIEL CONTRAST DIRECT REFERRAL (N/A)    Final Diagnosis: Lumbar stenosis with neurogenic claudication [M48.062]    Disposition: Home or self care    Patient Instructions:   Current Discharge Medication List        CONTINUE these medications which have NOT CHANGED    Details   amlodipine-benazepril 10-20mg (LOTREL) 10-20 mg per capsule Take 1 capsule by mouth once daily.      atorvastatin (LIPITOR) 10 MG tablet Take 10 mg by mouth nightly.      diclofenac sodium (VOLTAREN) 1 % Gel Apply 2 g topically 3 (three) times daily.  Qty: 200 g, Refills: 5    Associated Diagnoses: Primary osteoarthritis of both knees      gabapentin (NEURONTIN) 300 MG capsule Take 1 capsule (300 mg total) by mouth 3 (three) times daily.  Qty: 90 capsule, Refills: 3      hydrocortisone 2.5 % cream Apply topically 2 (two) times daily.      !! meloxicam (MOBIC) 15 MG tablet Take 1 tablet (15 mg total) by mouth once daily. TAKE WITH FOOD  Qty: 30 tablet, Refills: 1    Associated Diagnoses: Right shoulder pain, unspecified chronicity; Primary osteoarthritis of both knees      !! meloxicam (MOBIC) 15 MG tablet Take 1 tablet (15 mg total) by mouth daily as needed for Pain (back pain).  Qty: 30 tablet, Refills: 0      traMADoL (ULTRAM) 50 mg tablet Take 50 mg by mouth 3 (three) times daily as needed.      triamcinolone acetonide 0.1% (KENALOG) 0.1 % cream Apply topically 2 (two) times daily.       !! - Potential duplicate medications found. Please discuss with provider.              Discharge Diagnosis: Lumbar stenosis with neurogenic claudication [M48.062]  Condition on Discharge: Stable with no complications to procedure   Diet on Discharge: Same as before.  Activity: as per instruction sheet.  Discharge to: Home with a  responsible adult.  Follow up: 2-4 weeks       Please call my office or pager at [phone] if experienced any weakness or loss of sensation, fever > 101.5, pain uncontrolled with oral medications, persistent nausea/vomiting/or diarrhea, redness or drainage from the incisions, or any other worrisome concerns. If physician on call was not reached or could not communicate with our office for any reason please go to the nearest emergency department      Angelica Pedraza  10/25/2022

## 2022-10-25 NOTE — DISCHARGE INSTRUCTIONS

## 2022-12-12 DIAGNOSIS — M54.16 LUMBAR RADICULOPATHY: Primary | ICD-10-CM

## 2022-12-20 ENCOUNTER — OFFICE VISIT (OUTPATIENT)
Dept: NEUROSURGERY | Facility: CLINIC | Age: 57
End: 2022-12-20
Payer: MEDICAID

## 2022-12-20 DIAGNOSIS — M54.16 LUMBAR RADICULOPATHY: Primary | ICD-10-CM

## 2022-12-20 PROCEDURE — 1159F MED LIST DOCD IN RCRD: CPT | Mod: CPTII,,, | Performed by: NEUROLOGICAL SURGERY

## 2022-12-20 PROCEDURE — 99999 PR PBB SHADOW E&M-EST. PATIENT-LVL II: ICD-10-PCS | Mod: PBBFAC,,, | Performed by: NEUROLOGICAL SURGERY

## 2022-12-20 PROCEDURE — 99214 OFFICE O/P EST MOD 30 MIN: CPT | Mod: S$PBB,,, | Performed by: NEUROLOGICAL SURGERY

## 2022-12-20 PROCEDURE — 1160F PR REVIEW ALL MEDS BY PRESCRIBER/CLIN PHARMACIST DOCUMENTED: ICD-10-PCS | Mod: CPTII,,, | Performed by: NEUROLOGICAL SURGERY

## 2022-12-20 PROCEDURE — 99212 OFFICE O/P EST SF 10 MIN: CPT | Mod: PBBFAC | Performed by: NEUROLOGICAL SURGERY

## 2022-12-20 PROCEDURE — 1160F RVW MEDS BY RX/DR IN RCRD: CPT | Mod: CPTII,,, | Performed by: NEUROLOGICAL SURGERY

## 2022-12-20 PROCEDURE — 1159F PR MEDICATION LIST DOCUMENTED IN MEDICAL RECORD: ICD-10-PCS | Mod: CPTII,,, | Performed by: NEUROLOGICAL SURGERY

## 2022-12-20 PROCEDURE — 99999 PR PBB SHADOW E&M-EST. PATIENT-LVL II: CPT | Mod: PBBFAC,,, | Performed by: NEUROLOGICAL SURGERY

## 2022-12-20 PROCEDURE — 99214 PR OFFICE/OUTPT VISIT, EST, LEVL IV, 30-39 MIN: ICD-10-PCS | Mod: S$PBB,,, | Performed by: NEUROLOGICAL SURGERY

## 2022-12-20 RX ORDER — GABAPENTIN 800 MG/1
800 TABLET ORAL 3 TIMES DAILY
Status: ON HOLD | COMMUNITY
Start: 2022-10-12 | End: 2023-01-13 | Stop reason: HOSPADM

## 2022-12-20 RX ORDER — ACETAMINOPHEN 325 MG/1
325 TABLET ORAL EVERY 6 HOURS PRN
Status: ON HOLD | COMMUNITY
End: 2023-04-24 | Stop reason: HOSPADM

## 2023-01-03 ENCOUNTER — TELEPHONE (OUTPATIENT)
Dept: PREADMISSION TESTING | Facility: HOSPITAL | Age: 58
End: 2023-01-03
Payer: MEDICAID

## 2023-01-03 DIAGNOSIS — M51.26 HNP (HERNIATED NUCLEUS PULPOSUS), LUMBAR: Primary | ICD-10-CM

## 2023-01-04 ENCOUNTER — TELEPHONE (OUTPATIENT)
Dept: PREADMISSION TESTING | Facility: HOSPITAL | Age: 58
End: 2023-01-04
Payer: MEDICAID

## 2023-01-04 DIAGNOSIS — Z01.818 PREOPERATIVE TESTING: Primary | ICD-10-CM

## 2023-01-04 NOTE — PROGRESS NOTES
Neurosurgery  Established Patient    SUBJECTIVE:     History of Present Illness:  Patient is here to see me for follow-up after last evaluation patient on 10/11/2022.  This is a 57-year-old male with close to a year history of increasing lower back and bilateral leg pain.  He has signs and symptoms of significant neurogenic claudication would also now has noted some increasing weakness of the left foot.  Patient has treated conservatively for physical therapy without any significant improvement.  He is also had anti-inflammatories gabapentin and muscle relaxants.  He is also had 2 epidural injections.  He is only had temporary relief with epidural injections.  Now he is unable to walk for prolonged period time by normal blocker so.  It is having subtle signs of possible left foot weakness.  He denies any bowel bladder issues.    Review of patient's allergies indicates:  No Known Allergies    Current Outpatient Medications   Medication Sig Dispense Refill    acetaminophen (TYLENOL) 325 MG tablet Take 325 mg by mouth every 6 (six) hours as needed for Pain.      amlodipine-benazepril 10-20mg (LOTREL) 10-20 mg per capsule Take 1 capsule by mouth once daily.      atorvastatin (LIPITOR) 10 MG tablet Take 10 mg by mouth nightly.      diclofenac sodium (VOLTAREN) 1 % Gel Apply 2 g topically 3 (three) times daily. 200 g 5    gabapentin (NEURONTIN) 300 MG capsule Take 1 capsule (300 mg total) by mouth 3 (three) times daily. 90 capsule 3    gabapentin (NEURONTIN) 800 MG tablet Take 800 mg by mouth 3 (three) times daily.      traMADoL (ULTRAM) 50 mg tablet Take 50 mg by mouth 3 (three) times daily as needed.      meloxicam (MOBIC) 15 MG tablet Take 1 tablet (15 mg total) by mouth daily as needed for Pain (back pain). 30 tablet 0     No current facility-administered medications for this visit.       Past Medical History:   Diagnosis Date    Hyperlipidemia     Hypertension      Past Surgical History:   Procedure Laterality Date     EPIDURAL STEROID INJECTION N/A 5/24/2022    Procedure: INJECTION, STEROID, EPIDURAL, L5/S1 IL CONTRAST DIRECT REF;  Surgeon: Angelica Pedraza MD;  Location: [facility] PAIN MGT;  Service: Pain Management;  Laterality: N/A;    EPIDURAL STEROID INJECTION N/A 10/25/2022    Procedure: LUMBAR L5/S1 IL DARIEL CONTRAST DIRECT REFERRAL;  Surgeon: Angelica Pedraza MD;  Location: [facility] PAIN [facility]GT;  Service: Pain Management;  Laterality: N/A;     Family History    None       Social History     Socioeconomic History    Marital status: Unknown   Tobacco Use    Smoking status: Never    Smokeless tobacco: Never   Substance and Sexual Activity    Alcohol use: Never    Drug use: Never    Sexual activity: Yes     Partners: Male   Social History Narrative    ** Merged History Encounter **            Review of Systems    OBJECTIVE:     Vital Signs  Pain Score: 10-Worst pain ever  There is no height or weight on file to calculate BMI.    Neurosurgery Physical Exam    Patient is awake alert appropriate  His cranial nerves are intact    He is a positive straight leg raise on the left  He is 5/5 throughout all muscle groups tested except that the left extensor hallucis and tibialis anterior as well as gastrocs appear to be little weak  Discussed decreased sensation left L5 distribution    Diagnostic Results:    MRI scan of lumbar spine  L 3 L4 minimal mild posterior disc bulge, prominent facet joint and ligamentum flavum hypertrophy with narrowing of lateral recesses, severe spinal and moderate foramen stenosis.     L4-5 moderate posterior disc bulge, posterior central annular fissure without evidence of superimposed central left paracentral disc prolapse, significant compression narrowing lateral recesses particularly on left, prompt facet joint arthropathy, hypertrophy ligamentum flavum, left synovial facet cyst of 2 x 6 mm size, severe spinal and mild moderate foramen stenosis.       ASSESSMENT/PLAN:     57-year-old with significant focal  stenosis at L4-5 with a left paracentral disc herniation as well as synovial cyst causing both central and foraminal stenosis.  He is failed conservative management and he is developing motor weakness.  This stage I think he would benefit from surgery.  I would recommend a hemilaminectomy decompression of central canal as was a medial facetectomy and resection of the synovial cyst with microsurgical techniques.    I have discussed the risks/benefits, indications, and alternatives for the proposed procedure in detail. I have answered all of their questions and patient wish to proceed with surgery. We will schedule patient.           Note dictated with voice recognition software, please excuse any grammatical errors.

## 2023-01-04 NOTE — PRE-PROCEDURE INSTRUCTIONS
Used the Italian , Nicolle at [facility] for this phone call. Patient was able to communicate in English with no problem.  stayed on the phone in case would have a language barrier.Patient stated has not had any problem with anesthesia in the past.  He has a poc appt already scheduled for 1/6. Will need labs , ua, and ekg. Our  will call to set up these appts. He said he can read English and uses  the [facility] portal.    Preop instructions given. Hold aspirin, aspirin containing products, nsaids(aleve, advil, motrin, ibuprofen, naprosyn, naproxen, voltaren, diclofenac, Voltaren, Mobic , Meloxicam), vitamins and supplements one week prior to surgery.     May take Tylenol.( Sent to [facility] portal)  Verbalizes understanding.

## 2023-01-04 NOTE — ANESTHESIA PAT ROS NOTE
01/04/2023  Juanita Valverde is a 57 y.o., male.      Pre-op Assessment          Review of Systems         Anesthesia Assessment: Preoperative EQUATION    Planned Procedure: Procedure(s) (LRB):  LAMINECTOMY, SPINE, MINIMALLY INVASIVE L L4-5 (Left)  Requested Anesthesia Type:General  Surgeon: Manny Stanley MD  Service: Neurosurgery  Known or anticipated Date of Surgery:1/12/2023    Surgeon notes: reviewed    Electronic QUestionnaire Assessment completed via nurse interview with patient.        Triage considerations:     The patient has no apparent active cardiac condition (No unstable coronary Syndrome such as severe unstable angina or recent [<1 month] myocardial infarction, decompensated CHF, severe valvular   disease or significant arrhythmia)    Previous anesthesia records:No problems and Not available    Last PCP note: > 1 year ago , outside [facility]   Subspecialty notes: Neurology, Neurosurgery    Other important co-morbidities:PER Epic;  HLD, HTN, and HNP       Tests already available:  Available tests,  6-12 months ago , within [facility] .   5/17/2022 MRI LUMBAR SPINE W/O CONTRAST          Instructions given. (See in Nurse's note)    Optimization:  Anesthesia Preop Clinic Assessment  Indicated          Plan:    Testing:  BMP, EKG, Hematology Profile, PT/INR, PTT, T&S, and UA   Pre-anesthesia  visit       Visit focus:  was requested by surgeon's office        Patient  has previously scheduled Medical Appointment:1/6 poc appt    Navigation: Tests Scheduled. TBD             Consults scheduled.1/6             Results will be tracked by Preop Clinic.  1/11 Labs and UA resulted and noted by Dr. Lopez. EKG resulted and noted by Dr. Chacho Hardy.   Mary Storm RN BSN

## 2023-01-04 NOTE — TELEPHONE ENCOUNTER
----- Message from Mary Storm RN sent at 1/4/2023  9:45 AM CST -----  Surgery 1/12    other 1/6  Please schedule labs, ua, and ekg for 1/6. He understands English fine.  Thanks!

## 2023-01-06 ENCOUNTER — HOSPITAL ENCOUNTER (OUTPATIENT)
Dept: PREADMISSION TESTING | Facility: HOSPITAL | Age: 58
Discharge: HOME | End: 2023-01-06
Attending: ANESTHESIOLOGY
Payer: MEDICAID

## 2023-01-06 ENCOUNTER — ANESTHESIA EVENT (OUTPATIENT)
Dept: SURGERY | Facility: HOSPITAL | Age: 58
DRG: 519 | End: 2023-01-06
Payer: MEDICAID

## 2023-01-06 VITALS
RESPIRATION RATE: 16 BRPM | OXYGEN SATURATION: 97 % | BODY MASS INDEX: 26.53 KG/M2 | DIASTOLIC BLOOD PRESSURE: 77 MMHG | WEIGHT: 179.13 LBS | TEMPERATURE: 98 F | HEIGHT: 69 IN | SYSTOLIC BLOOD PRESSURE: 136 MMHG | HEART RATE: 67 BPM

## 2023-01-06 NOTE — ANESTHESIA PREPROCEDURE EVALUATION
[facility]  Anesthesia Pre-Operative Evaluation         Patient Name: Juanita Valverde  YOB: 1965  MRN: [id number]    SUBJECTIVE:     Pre-operative evaluation for Procedure(s) (LRB):  LAMINECTOMY, SPINE, MINIMALLY INVASIVE L L4-5 (Left)  Scheduled for 1/12/2023    HPI 01/06/2023:  Juanita Valverde is a 57 y.o. male with hx of HTN and HLD. He has had bilateral lumbar and leg pain for around a year, now with symptoms of neurogenic claudication and motor impairment. He has been undergoing conservative management with physical therapy and pain management (on mobic and gabapentin, s/p epidural injection x 2). Now scheduled for above procedure.    He is currently in his usual state of health. He denies recent chest pain, shortness of breath, or leg swelling. Physical activity is greatly limited by his back and leg pain but before he had no problems with activity. He takes lotrel for his BP and is well controlled. Instructed to hold morning of surgery.    Prev airway:   None on file    Oxygen/Ventilation Requirements:  On room air        There is no problem list on file for this patient.      Review of patient's allergies indicates:  No Known Allergies    Outpatient Medications:  Current Outpatient Medications on File Prior to Encounter   Medication Sig Dispense Refill    acetaminophen (TYLENOL) 325 MG tablet Take 325 mg by mouth every 6 (six) hours as needed for Pain.      amlodipine-benazepril 10-20mg (LOTREL) 10-20 mg per capsule Take 1 capsule by mouth once daily.      atorvastatin (LIPITOR) 10 MG tablet Take 10 mg by mouth nightly.      diclofenac sodium (VOLTAREN) 1 % Gel Apply 2 g topically 3 (three) times daily. 200 g 5    gabapentin (NEURONTIN) 300 MG capsule Take 1 capsule (300 mg total) by mouth 3 (three) times daily. 90 capsule 3    gabapentin (NEURONTIN) 800 MG tablet Take 800 mg by mouth 3 (three) times daily.      meloxicam (MOBIC) 15 MG tablet Take 1 tablet (15 mg total) by mouth  daily as needed for Pain (back pain). 30 tablet 0    traMADoL (ULTRAM) 50 mg tablet Take 50 mg by mouth 3 (three) times daily as needed.       No current facility-administered medications on file prior to encounter.        Current Inpatient Medications:      Past Surgical History:   Procedure Laterality Date    EPIDURAL STEROID INJECTION N/A 5/24/2022    Procedure: INJECTION, STEROID, EPIDURAL, L5/S1 IL CONTRAST DIRECT REF;  Surgeon: Angelica Pedraza MD;  Location: [facility]IN [facility];  Service: Pain Management;  Laterality: N/A;    EPIDURAL STEROID INJECTION N/A 10/25/2022    Procedure: LUMBAR L5/S1 IL DARIEL CONTRAST DIRECT REFERRAL;  Surgeon: Angelica Pedraza MD;  Location: [facility]IN [facility];  Service: Pain Management;  Laterality: N/A;       Social History     Socioeconomic History    Marital status: Unknown   Tobacco Use    Smoking status: Never    Smokeless tobacco: Never   Substance and Sexual Activity    Alcohol use: Never    Drug use: Never    Sexual activity: Yes     Partners: Male   Social History Narrative    ** Merged History Encounter **            OBJECTIVE:     Weight:  Wt Readings from Last 1 Encounters:   01/06/23 81.2 kg (179 lb 1.6 oz)     Body mass index is 26.45 kg/m².    Recent Blood Pressure Readings:  BP Readings from Last 3 Encounters:   01/06/23 136/77   10/25/22 138/84   10/11/22 137/80       Vital Signs Range (Last 24H):  Temp:  [36.8 °C (98.2 °F)]   Pulse:  [67]   Resp:  [16]   BP: (136)/(77)   SpO2:  [97 %]       CBC:   Lab Results   Component Value Date    WBC 6.5 10/11/2021    HGB 13.2 10/11/2021    HCT 40.3 10/11/2021    MCV 92 10/11/2021     10/11/2021       CMP:     Chemistry        Component Value Date/Time     10/11/2021 0910    K 4.2 10/11/2021 0910     10/11/2021 0910    CO2 25 10/11/2021 0910    BUN 13 10/11/2021 0910    CREATININE 0.49 (L) 10/11/2021 0910     (H) 10/11/2021 0910        Component Value Date/Time    CALCIUM 9.1 10/11/2021 0910    AST  20 10/11/2021 0910    ALT 14 10/11/2021 0910    BILITOT 0.4 10/11/2021 0910    EGFRNONAA 120 10/11/2021 0910            INR:  No results found for: INR, PROTIME    Diagnostic Studies:      EKG:    No results found for this or any previous visit.    2D Echo:    No results found for this or any previous visit.      ASSESSMENT/PLAN:           Pre-op Assessment    I have reviewed the Patient Summary Reports.          Review of Systems  Anesthesia Hx:  No problems with previous Anesthesia    Social:  Non-Smoker    Cardiovascular:   Hypertension Denies CAD.     Denies Angina. hyperlipidemia    Pulmonary:   Denies COPD.  Denies Asthma.    Hepatic/GI:   Denies PUD. Denies GERD.    Musculoskeletal:  Spine Disorders: lumbar Chronic Pain    Neurological:   Denies CVA.  Denies Headaches.    Endocrine:   Denies Diabetes.  Denies Obesity / BMI > 30  Psych:   Denies Psychiatric History.          Physical Exam  General: Well nourished, Cooperative, Alert and Oriented    Airway:  Mallampati: III / II  Mouth Opening: Normal  TM Distance: Normal  Tongue: Normal  Neck ROM: Normal ROM    Dental:  Intact    Chest/Lungs:  Clear to auscultation, Normal Respiratory Rate        Anesthesia Plan  Type of Anesthesia, risks & benefits discussed:    Anesthesia Type: Gen ETT  Intra-op Monitoring Plan: Standard ASA Monitors  Post Op Pain Control Plan: multimodal analgesia and IV/PO Opioids PRN  Induction:  IV  Airway Plan: Direct and Video, Post-Induction  ASA Score: 2  Day of Surgery Review of History & Physical: H&P Update referred to the surgeon/provider.    Ready For Surgery From Anesthesia Perspective.     .

## 2023-01-09 ENCOUNTER — LAB VISIT (OUTPATIENT)
Dept: LAB | Facility: HOSPITAL | Age: 58
End: 2023-01-09
Attending: ANESTHESIOLOGY
Payer: MEDICAID

## 2023-01-09 DIAGNOSIS — Z01.818 PREOPERATIVE TESTING: ICD-10-CM

## 2023-01-09 DIAGNOSIS — Z01.818 PREOPERATIVE TESTING: Primary | ICD-10-CM

## 2023-01-09 LAB
BILIRUB UR QL STRIP: NEGATIVE
CLARITY UR: CLEAR
COLOR UR: YELLOW
GLUCOSE UR QL STRIP: NEGATIVE
HGB UR QL STRIP: NEGATIVE
KETONES UR QL STRIP: NEGATIVE
LEUKOCYTE ESTERASE UR QL STRIP: NEGATIVE
NITRITE UR QL STRIP: NEGATIVE
PH UR STRIP: 7 [PH] (ref 5–8)
PROT UR QL STRIP: NEGATIVE
SP GR UR STRIP: 1.01 (ref 1–1.03)
URN SPEC COLLECT METH UR: NORMAL
UROBILINOGEN UR STRIP-ACNC: NEGATIVE EU/DL

## 2023-01-09 PROCEDURE — 81003 URINALYSIS AUTO W/O SCOPE: CPT | Performed by: ANESTHESIOLOGY

## 2023-01-10 ENCOUNTER — PATIENT MESSAGE (OUTPATIENT)
Dept: ADMINISTRATIVE | Facility: OTHER | Age: 58
End: 2023-01-10
Payer: MEDICAID

## 2023-01-10 ENCOUNTER — HOSPITAL ENCOUNTER (OUTPATIENT)
Dept: CARDIOLOGY | Facility: HOSPITAL | Age: 58
Discharge: HOME | End: 2023-01-10
Attending: ANESTHESIOLOGY
Payer: MEDICAID

## 2023-01-10 DIAGNOSIS — Z01.818 PREOPERATIVE TESTING: ICD-10-CM

## 2023-01-10 PROCEDURE — 93010 ELECTROCARDIOGRAM REPORT: CPT | Mod: ,,, | Performed by: INTERNAL MEDICINE

## 2023-01-10 PROCEDURE — 93005 ELECTROCARDIOGRAM TRACING: CPT

## 2023-01-10 PROCEDURE — 93010 EKG 12-LEAD: ICD-10-PCS | Mod: ,,, | Performed by: INTERNAL MEDICINE

## 2023-01-11 ENCOUNTER — PATIENT MESSAGE (OUTPATIENT)
Dept: ADMINISTRATIVE | Facility: OTHER | Age: 58
End: 2023-01-11
Payer: MEDICAID

## 2023-01-11 ENCOUNTER — TELEPHONE (OUTPATIENT)
Dept: NEUROSURGERY | Facility: CLINIC | Age: 58
End: 2023-01-11
Payer: MEDICAID

## 2023-01-11 NOTE — TELEPHONE ENCOUNTER
Spoke to Mr Valverde , notified to arrive for 5 am to 2nd floor DOS for surgery. Advised NPO after midnight the night before  procedure , bathe w/ Hibiclens or dial from neck down the night before and morning of  surgery.. Patient verbalized acknowledgement

## 2023-01-12 ENCOUNTER — ANESTHESIA (OUTPATIENT)
Dept: SURGERY | Facility: HOSPITAL | Age: 58
DRG: 519 | End: 2023-01-12
Payer: MEDICAID

## 2023-01-12 ENCOUNTER — HOSPITAL ENCOUNTER (INPATIENT)
Facility: HOSPITAL | Age: 58
LOS: 1 days | Discharge: HOME | DRG: 519 | End: 2023-01-13
Attending: NEUROLOGICAL SURGERY | Admitting: NEUROLOGICAL SURGERY
Payer: MEDICAID

## 2023-01-12 DIAGNOSIS — M51.16 LUMBAR DISC HERNIATION WITH RADICULOPATHY: ICD-10-CM

## 2023-01-12 LAB
ABO + RH BLD: NORMAL
BLD GP AB SCN CELLS X3 SERPL QL: NORMAL

## 2023-01-12 PROCEDURE — 25000003 PHARM REV CODE 250: Performed by: STUDENT IN AN ORGANIZED HEALTH CARE EDUCATION/TRAINING PROGRAM

## 2023-01-12 PROCEDURE — 63600175 PHARM REV CODE 636 W HCPCS: Performed by: STUDENT IN AN ORGANIZED HEALTH CARE EDUCATION/TRAINING PROGRAM

## 2023-01-12 PROCEDURE — 94761 N-INVAS EAR/PLS OXIMETRY MLT: CPT

## 2023-01-12 PROCEDURE — D9220A PRA ANESTHESIA: ICD-10-PCS | Mod: CRNA,,, | Performed by: NURSE ANESTHETIST, CERTIFIED REGISTERED

## 2023-01-12 PROCEDURE — 27000221 HC OXYGEN, UP TO 24 HOURS

## 2023-01-12 PROCEDURE — 25000003 PHARM REV CODE 250: Performed by: NURSE ANESTHETIST, CERTIFIED REGISTERED

## 2023-01-12 PROCEDURE — 25000003 PHARM REV CODE 250: Performed by: NEUROLOGICAL SURGERY

## 2023-01-12 PROCEDURE — 36000711: Performed by: NEUROLOGICAL SURGERY

## 2023-01-12 PROCEDURE — 71000033 HC RECOVERY, INTIAL HOUR: Performed by: NEUROLOGICAL SURGERY

## 2023-01-12 PROCEDURE — 71000039 HC RECOVERY, EACH ADD'L HOUR: Performed by: NEUROLOGICAL SURGERY

## 2023-01-12 PROCEDURE — 25000003 PHARM REV CODE 250: Performed by: ANESTHESIOLOGY

## 2023-01-12 PROCEDURE — 25000003 PHARM REV CODE 250

## 2023-01-12 PROCEDURE — 71000016 HC POSTOP RECOV ADDL HR: Performed by: NEUROLOGICAL SURGERY

## 2023-01-12 PROCEDURE — 63600175 PHARM REV CODE 636 W HCPCS: Performed by: NURSE ANESTHETIST, CERTIFIED REGISTERED

## 2023-01-12 PROCEDURE — 37000009 HC ANESTHESIA EA ADD 15 MINS: Performed by: NEUROLOGICAL SURGERY

## 2023-01-12 PROCEDURE — 63047 LAM FACETEC & FORAMOT LUMBAR: CPT | Mod: ,,, | Performed by: NEUROLOGICAL SURGERY

## 2023-01-12 PROCEDURE — 63600175 PHARM REV CODE 636 W HCPCS: Performed by: NEUROLOGICAL SURGERY

## 2023-01-12 PROCEDURE — D9220A PRA ANESTHESIA: ICD-10-PCS | Mod: ANES,,, | Performed by: ANESTHESIOLOGY

## 2023-01-12 PROCEDURE — 63047 PR LAMINEC/FACETECT/FORAMIN,LUMBAR 1 SEG: ICD-10-PCS | Mod: ,,, | Performed by: NEUROLOGICAL SURGERY

## 2023-01-12 PROCEDURE — 94799 UNLISTED PULMONARY SVC/PX: CPT

## 2023-01-12 PROCEDURE — D9220A PRA ANESTHESIA: Mod: CRNA,,, | Performed by: NURSE ANESTHETIST, CERTIFIED REGISTERED

## 2023-01-12 PROCEDURE — D9220A PRA ANESTHESIA: Mod: ANES,,, | Performed by: ANESTHESIOLOGY

## 2023-01-12 PROCEDURE — 11000001 HC ACUTE MED/SURG PRIVATE ROOM

## 2023-01-12 PROCEDURE — 71000015 HC POSTOP RECOV 1ST HR: Performed by: NEUROLOGICAL SURGERY

## 2023-01-12 PROCEDURE — 99900035 HC TECH TIME PER 15 MIN (STAT)

## 2023-01-12 PROCEDURE — 36000710: Performed by: NEUROLOGICAL SURGERY

## 2023-01-12 PROCEDURE — 37000008 HC ANESTHESIA 1ST 15 MINUTES: Performed by: NEUROLOGICAL SURGERY

## 2023-01-12 PROCEDURE — 63600175 PHARM REV CODE 636 W HCPCS

## 2023-01-12 PROCEDURE — 27201423 OPTIME MED/SURG SUP & DEVICES STERILE SUPPLY: Performed by: NEUROLOGICAL SURGERY

## 2023-01-12 PROCEDURE — 86900 BLOOD TYPING SEROLOGIC ABO: CPT | Performed by: STUDENT IN AN ORGANIZED HEALTH CARE EDUCATION/TRAINING PROGRAM

## 2023-01-12 RX ORDER — SODIUM CHLORIDE 9 MG/ML
INJECTION, SOLUTION INTRAVENOUS CONTINUOUS
Status: DISCONTINUED | OUTPATIENT
Start: 2023-01-12 | End: 2023-01-13

## 2023-01-12 RX ORDER — BUPIVACAINE HYDROCHLORIDE AND EPINEPHRINE 5; 5 MG/ML; UG/ML
INJECTION, SOLUTION EPIDURAL; INTRACAUDAL; PERINEURAL
Status: DISCONTINUED | OUTPATIENT
Start: 2023-01-12 | End: 2023-01-12 | Stop reason: HOSPADM

## 2023-01-12 RX ORDER — MAGNESIUM SULFATE HEPTAHYDRATE 500 MG/ML
INJECTION, SOLUTION INTRAMUSCULAR; INTRAVENOUS
Status: DISCONTINUED | OUTPATIENT
Start: 2023-01-12 | End: 2023-01-12

## 2023-01-12 RX ORDER — METHOCARBAMOL 500 MG/1
500 TABLET, FILM COATED ORAL 4 TIMES DAILY
Status: DISCONTINUED | OUTPATIENT
Start: 2023-01-12 | End: 2023-01-13

## 2023-01-12 RX ORDER — OXYCODONE HYDROCHLORIDE 5 MG/1
5 TABLET ORAL ONCE AS NEEDED
Status: COMPLETED | OUTPATIENT
Start: 2023-01-12 | End: 2023-01-12

## 2023-01-12 RX ORDER — DEXMEDETOMIDINE HYDROCHLORIDE 100 UG/ML
INJECTION, SOLUTION INTRAVENOUS
Status: DISCONTINUED | OUTPATIENT
Start: 2023-01-12 | End: 2023-01-12

## 2023-01-12 RX ORDER — SODIUM CHLORIDE 0.9 % (FLUSH) 0.9 %
10 SYRINGE (ML) INJECTION
Status: DISCONTINUED | OUTPATIENT
Start: 2023-01-12 | End: 2023-01-13 | Stop reason: HOSPADM

## 2023-01-12 RX ORDER — HEPARIN SODIUM 5000 [USP'U]/ML
5000 INJECTION, SOLUTION INTRAVENOUS; SUBCUTANEOUS EVERY 8 HOURS
Status: DISCONTINUED | OUTPATIENT
Start: 2023-01-12 | End: 2023-01-13 | Stop reason: HOSPADM

## 2023-01-12 RX ORDER — PROPOFOL 10 MG/ML
VIAL (ML) INTRAVENOUS
Status: DISCONTINUED | OUTPATIENT
Start: 2023-01-12 | End: 2023-01-12

## 2023-01-12 RX ORDER — DEXAMETHASONE SODIUM PHOSPHATE 4 MG/ML
INJECTION, SOLUTION INTRA-ARTICULAR; INTRALESIONAL; INTRAMUSCULAR; INTRAVENOUS; SOFT TISSUE
Status: DISCONTINUED | OUTPATIENT
Start: 2023-01-12 | End: 2023-01-12

## 2023-01-12 RX ORDER — ACETAMINOPHEN 10 MG/ML
INJECTION, SOLUTION INTRAVENOUS
Status: DISCONTINUED | OUTPATIENT
Start: 2023-01-12 | End: 2023-01-12

## 2023-01-12 RX ORDER — GABAPENTIN 300 MG/1
300 CAPSULE ORAL 3 TIMES DAILY
Status: DISCONTINUED | OUTPATIENT
Start: 2023-01-12 | End: 2023-01-13 | Stop reason: HOSPADM

## 2023-01-12 RX ORDER — BISACODYL 10 MG
10 SUPPOSITORY, RECTAL RECTAL DAILY
Status: DISCONTINUED | OUTPATIENT
Start: 2023-01-12 | End: 2023-01-13

## 2023-01-12 RX ORDER — OXYCODONE HYDROCHLORIDE 5 MG/1
5 TABLET ORAL EVERY 4 HOURS PRN
Status: DISCONTINUED | OUTPATIENT
Start: 2023-01-12 | End: 2023-01-13

## 2023-01-12 RX ORDER — ROCURONIUM BROMIDE 10 MG/ML
INJECTION, SOLUTION INTRAVENOUS
Status: DISCONTINUED | OUTPATIENT
Start: 2023-01-12 | End: 2023-01-12

## 2023-01-12 RX ORDER — HALOPERIDOL 5 MG/ML
0.5 INJECTION INTRAMUSCULAR EVERY 10 MIN PRN
Status: DISCONTINUED | OUTPATIENT
Start: 2023-01-12 | End: 2023-01-12 | Stop reason: HOSPADM

## 2023-01-12 RX ORDER — AMLODIPINE AND BENAZEPRIL HYDROCHLORIDE 5; 20 MG/1; MG/1
1 CAPSULE ORAL DAILY
Status: DISCONTINUED | OUTPATIENT
Start: 2023-01-12 | End: 2023-01-13 | Stop reason: HOSPADM

## 2023-01-12 RX ORDER — ACETAMINOPHEN 500 MG
1000 TABLET ORAL 3 TIMES DAILY
Status: DISCONTINUED | OUTPATIENT
Start: 2023-01-12 | End: 2023-01-13 | Stop reason: HOSPADM

## 2023-01-12 RX ORDER — MORPHINE SULFATE 2 MG/ML
1 INJECTION, SOLUTION INTRAMUSCULAR; INTRAVENOUS
Status: DISCONTINUED | OUTPATIENT
Start: 2023-01-12 | End: 2023-01-13 | Stop reason: HOSPADM

## 2023-01-12 RX ORDER — ONDANSETRON 2 MG/ML
4 INJECTION INTRAMUSCULAR; INTRAVENOUS DAILY PRN
Status: DISCONTINUED | OUTPATIENT
Start: 2023-01-12 | End: 2023-01-12 | Stop reason: HOSPADM

## 2023-01-12 RX ORDER — AMOXICILLIN 250 MG
2 CAPSULE ORAL NIGHTLY PRN
Status: DISCONTINUED | OUTPATIENT
Start: 2023-01-12 | End: 2023-01-13 | Stop reason: HOSPADM

## 2023-01-12 RX ORDER — PROCHLORPERAZINE EDISYLATE 5 MG/ML
5 INJECTION INTRAMUSCULAR; INTRAVENOUS EVERY 6 HOURS PRN
Status: DISCONTINUED | OUTPATIENT
Start: 2023-01-12 | End: 2023-01-13 | Stop reason: HOSPADM

## 2023-01-12 RX ORDER — FENTANYL CITRATE 50 UG/ML
25 INJECTION, SOLUTION INTRAMUSCULAR; INTRAVENOUS EVERY 5 MIN PRN
Status: DISCONTINUED | OUTPATIENT
Start: 2023-01-12 | End: 2023-01-12

## 2023-01-12 RX ORDER — ONDANSETRON 8 MG/1
8 TABLET, ORALLY DISINTEGRATING ORAL EVERY 6 HOURS PRN
Status: DISCONTINUED | OUTPATIENT
Start: 2023-01-12 | End: 2023-01-13 | Stop reason: HOSPADM

## 2023-01-12 RX ORDER — MEPERIDINE HYDROCHLORIDE 50 MG/ML
12.5 INJECTION INTRAMUSCULAR; INTRAVENOUS; SUBCUTANEOUS EVERY 10 MIN PRN
Status: DISCONTINUED | OUTPATIENT
Start: 2023-01-12 | End: 2023-01-12 | Stop reason: HOSPADM

## 2023-01-12 RX ORDER — FENTANYL CITRATE 50 UG/ML
INJECTION, SOLUTION INTRAMUSCULAR; INTRAVENOUS
Status: DISCONTINUED | OUTPATIENT
Start: 2023-01-12 | End: 2023-01-12

## 2023-01-12 RX ORDER — METHYLPREDNISOLONE ACETATE 40 MG/ML
INJECTION, SUSPENSION INTRA-ARTICULAR; INTRALESIONAL; INTRAMUSCULAR; SOFT TISSUE
Status: DISCONTINUED | OUTPATIENT
Start: 2023-01-12 | End: 2023-01-12 | Stop reason: HOSPADM

## 2023-01-12 RX ORDER — FENTANYL CITRATE 50 UG/ML
25 INJECTION, SOLUTION INTRAMUSCULAR; INTRAVENOUS EVERY 5 MIN PRN
Status: DISCONTINUED | OUTPATIENT
Start: 2023-01-12 | End: 2023-01-12 | Stop reason: HOSPADM

## 2023-01-12 RX ORDER — MUPIROCIN 20 MG/G
OINTMENT TOPICAL 2 TIMES DAILY
Status: DISCONTINUED | OUTPATIENT
Start: 2023-01-12 | End: 2023-01-13 | Stop reason: HOSPADM

## 2023-01-12 RX ORDER — MIDAZOLAM HYDROCHLORIDE 1 MG/ML
INJECTION INTRAMUSCULAR; INTRAVENOUS
Status: DISCONTINUED | OUTPATIENT
Start: 2023-01-12 | End: 2023-01-12

## 2023-01-12 RX ORDER — HALOPERIDOL 5 MG/ML
0.5 INJECTION INTRAMUSCULAR EVERY 10 MIN PRN
Status: DISCONTINUED | OUTPATIENT
Start: 2023-01-12 | End: 2023-01-12

## 2023-01-12 RX ORDER — HYDROMORPHONE HYDROCHLORIDE 1 MG/ML
0.2 INJECTION, SOLUTION INTRAMUSCULAR; INTRAVENOUS; SUBCUTANEOUS EVERY 5 MIN PRN
Status: DISCONTINUED | OUTPATIENT
Start: 2023-01-12 | End: 2023-01-12

## 2023-01-12 RX ORDER — ONDANSETRON 2 MG/ML
INJECTION INTRAMUSCULAR; INTRAVENOUS
Status: DISCONTINUED | OUTPATIENT
Start: 2023-01-12 | End: 2023-01-12

## 2023-01-12 RX ORDER — MAG HYDROX/ALUMINUM HYD/SIMETH 200-200-20
30 SUSPENSION, ORAL (FINAL DOSE FORM) ORAL EVERY 4 HOURS PRN
Status: DISCONTINUED | OUTPATIENT
Start: 2023-01-12 | End: 2023-01-13 | Stop reason: HOSPADM

## 2023-01-12 RX ORDER — IBUPROFEN 400 MG/1
400 TABLET ORAL EVERY 6 HOURS PRN
Status: DISCONTINUED | OUTPATIENT
Start: 2023-01-12 | End: 2023-01-13 | Stop reason: HOSPADM

## 2023-01-12 RX ORDER — HYDROMORPHONE HYDROCHLORIDE 1 MG/ML
0.2 INJECTION, SOLUTION INTRAMUSCULAR; INTRAVENOUS; SUBCUTANEOUS EVERY 10 MIN PRN
Status: DISCONTINUED | OUTPATIENT
Start: 2023-01-12 | End: 2023-01-12 | Stop reason: HOSPADM

## 2023-01-12 RX ORDER — LIDOCAINE HCL/PF 100 MG/5ML
SYRINGE (ML) INTRAVENOUS
Status: DISCONTINUED | OUTPATIENT
Start: 2023-01-12 | End: 2023-01-12

## 2023-01-12 RX ADMIN — HEPARIN SODIUM 5000 UNITS: 5000 INJECTION INTRAVENOUS; SUBCUTANEOUS at 03:01

## 2023-01-12 RX ADMIN — DEXMEDETOMIDINE HYDROCHLORIDE 20 MCG: 100 INJECTION, SOLUTION INTRAVENOUS at 09:01

## 2023-01-12 RX ADMIN — METHOCARBAMOL 500 MG: 500 TABLET ORAL at 09:01

## 2023-01-12 RX ADMIN — FENTANYL CITRATE 25 MCG: 50 INJECTION INTRAMUSCULAR; INTRAVENOUS at 12:01

## 2023-01-12 RX ADMIN — GABAPENTIN 300 MG: 300 CAPSULE ORAL at 03:01

## 2023-01-12 RX ADMIN — METHOCARBAMOL 500 MG: 500 TABLET ORAL at 05:01

## 2023-01-12 RX ADMIN — OXYCODONE HYDROCHLORIDE 5 MG: 5 TABLET ORAL at 11:01

## 2023-01-12 RX ADMIN — SUGAMMADEX 200 MG: 100 INJECTION, SOLUTION INTRAVENOUS at 08:01

## 2023-01-12 RX ADMIN — MIDAZOLAM HYDROCHLORIDE 2 MG: 1 INJECTION, SOLUTION INTRAMUSCULAR; INTRAVENOUS at 07:01

## 2023-01-12 RX ADMIN — METHOCARBAMOL 500 MG: 500 TABLET ORAL at 12:01

## 2023-01-12 RX ADMIN — OXYCODONE HYDROCHLORIDE 5 MG: 5 TABLET ORAL at 09:01

## 2023-01-12 RX ADMIN — ACETAMINOPHEN 1000 MG: 500 TABLET ORAL at 09:01

## 2023-01-12 RX ADMIN — CEFAZOLIN 2 G: 2 INJECTION, POWDER, FOR SOLUTION INTRAMUSCULAR; INTRAVENOUS at 07:01

## 2023-01-12 RX ADMIN — LIDOCAINE HYDROCHLORIDE 100 MG: 20 INJECTION, SOLUTION INTRAVENOUS at 07:01

## 2023-01-12 RX ADMIN — PROPOFOL 160 MG: 10 INJECTION, EMULSION INTRAVENOUS at 07:01

## 2023-01-12 RX ADMIN — ACETAMINOPHEN 1000 MG: 10 INJECTION INTRAVENOUS at 08:01

## 2023-01-12 RX ADMIN — SODIUM CHLORIDE: 0.9 INJECTION, SOLUTION INTRAVENOUS at 06:01

## 2023-01-12 RX ADMIN — ONDANSETRON 4 MG: 2 INJECTION INTRAMUSCULAR; INTRAVENOUS at 09:01

## 2023-01-12 RX ADMIN — CEFAZOLIN 2 G: 2 INJECTION, POWDER, FOR SOLUTION INTRAMUSCULAR; INTRAVENOUS at 03:01

## 2023-01-12 RX ADMIN — MUPIROCIN: 20 OINTMENT TOPICAL at 09:01

## 2023-01-12 RX ADMIN — MAGNESIUM SULFATE HEPTAHYDRATE 500 MG: 500 INJECTION, SOLUTION INTRAMUSCULAR; INTRAVENOUS at 09:01

## 2023-01-12 RX ADMIN — MAGNESIUM SULFATE HEPTAHYDRATE 500 MG: 500 INJECTION, SOLUTION INTRAMUSCULAR; INTRAVENOUS at 08:01

## 2023-01-12 RX ADMIN — FENTANYL CITRATE 50 MCG: 0.05 INJECTION, SOLUTION INTRAMUSCULAR; INTRAVENOUS at 07:01

## 2023-01-12 RX ADMIN — DEXAMETHASONE SODIUM PHOSPHATE 4 MG: 4 INJECTION, SOLUTION INTRAMUSCULAR; INTRAVENOUS at 07:01

## 2023-01-12 RX ADMIN — SODIUM CHLORIDE: 9 INJECTION, SOLUTION INTRAVENOUS at 10:01

## 2023-01-12 RX ADMIN — MUPIROCIN: 20 OINTMENT TOPICAL at 11:01

## 2023-01-12 RX ADMIN — GABAPENTIN 300 MG: 300 CAPSULE ORAL at 09:01

## 2023-01-12 RX ADMIN — PROPOFOL 40 MG: 10 INJECTION, EMULSION INTRAVENOUS at 09:01

## 2023-01-12 RX ADMIN — SODIUM CHLORIDE, SODIUM GLUCONATE, SODIUM ACETATE, POTASSIUM CHLORIDE, MAGNESIUM CHLORIDE, SODIUM PHOSPHATE, DIBASIC, AND POTASSIUM PHOSPHATE: .53; .5; .37; .037; .03; .012; .00082 INJECTION, SOLUTION INTRAVENOUS at 09:01

## 2023-01-12 RX ADMIN — HEPARIN SODIUM 5000 UNITS: 5000 INJECTION INTRAVENOUS; SUBCUTANEOUS at 09:01

## 2023-01-12 RX ADMIN — ROCURONIUM BROMIDE 40 MG: 10 INJECTION INTRAVENOUS at 07:01

## 2023-01-12 RX ADMIN — ACETAMINOPHEN 1000 MG: 500 TABLET ORAL at 03:01

## 2023-01-12 NOTE — PROGRESS NOTES
Chart reviewed, and pre-op nursing care complete per orders. Safe surgery checklist complete. Friend @ bedside, pt belongings given to her. Waiting for anesthesia consent, site-marking, and full H&P prior to surgery. Pt fluent in English, no  needed. Pt relaxing in room with lights dimmed, TV on, call light within reach.

## 2023-01-12 NOTE — TRANSFER OF CARE
"Anesthesia Transfer of Care Note    Patient: Juanita Valverde    Procedure(s) Performed: Procedure(s) (LRB):  LAMINECTOMY, SPINE, MINIMALLY INVASIVE L L4-5 (Left)    Patient location: PACU    Anesthesia Type: general    Transport from OR: Transported from OR on 6-10 L/min O2 by face mask with adequate spontaneous ventilation    Post pain: adequate analgesia    Post assessment: no apparent anesthetic complications and tolerated procedure well    Post vital signs: stable    Level of consciousness: sedated    Nausea/Vomiting: no nausea/vomiting    Complications: none    Transfer of care protocol was followed      Last vitals:   Visit Vitals  /75 (BP Location: Left arm, Patient Position: Lying)   Pulse 78   Temp 36.1 °C (97 °F) (Temporal)   Resp 18   Ht 5' 9" (1.753 m)   Wt 81.2 kg (179 lb 0.2 oz)   SpO2 100%   BMI 26.44 kg/m²     "
Attending Ferrer: 68 y/o male h/o MS who is nonambulatory presenting with subjective fevers and intermittent abdominal pain since november. reviewed old records, pt with h/o gallstones in the past. POCUS limited evaluation of the gallbladder with sig distended bladder and right sided hydroneprhosis. will place barton catheter and check UA. afebrile in the ed. upon arrival pt mildly tachycardic. no sob or pleuritic pain to suggest PE, however pt is nonambulatory making him at risk for PE. will send for radiology ultrasound of the gallbladder, check UA and further infectious work up and given IVF. pt will need admission, consider GI eval.

## 2023-01-12 NOTE — H&P
Please see clinic note from Dr. Stanley. Agree with assessment and plan.     Jihan Zavala  NSGY PGY2      Neurosurgery  Established Patient     SUBJECTIVE:      History of Present Illness:  Patient is here to see me for follow-up after last evaluation patient on 10/11/2022.  This is a 57-year-old male with close to a year history of increasing lower back and bilateral leg pain.  He has signs and symptoms of significant neurogenic claudication would also now has noted some increasing weakness of the left foot.  Patient has treated conservatively for physical therapy without any significant improvement.  He is also had anti-inflammatories gabapentin and muscle relaxants.  He is also had 2 epidural injections.  He is only had temporary relief with epidural injections.  Now he is unable to walk for prolonged period time by normal blocker so.  It is having subtle signs of possible left foot weakness.  He denies any bowel bladder issues.     Review of patient's allergies indicates:  No Known Allergies     Current Medications          Current Outpatient Medications   Medication Sig Dispense Refill    acetaminophen (TYLENOL) 325 MG tablet Take 325 mg by mouth every 6 (six) hours as needed for Pain.        amlodipine-benazepril 10-20mg (LOTREL) 10-20 mg per capsule Take 1 capsule by mouth once daily.        atorvastatin (LIPITOR) 10 MG tablet Take 10 mg by mouth nightly.        diclofenac sodium (VOLTAREN) 1 % Gel Apply 2 g topically 3 (three) times daily. 200 g 5    gabapentin (NEURONTIN) 300 MG capsule Take 1 capsule (300 mg total) by mouth 3 (three) times daily. 90 capsule 3    gabapentin (NEURONTIN) 800 MG tablet Take 800 mg by mouth 3 (three) times daily.        traMADoL (ULTRAM) 50 mg tablet Take 50 mg by mouth 3 (three) times daily as needed.        meloxicam (MOBIC) 15 MG tablet Take 1 tablet (15 mg total) by mouth daily as needed for Pain (back pain). 30 tablet 0      No current facility-administered medications for  this visit.                 Past Medical History:   Diagnosis Date    Hyperlipidemia      Hypertension              Past Surgical History:   Procedure Laterality Date    EPIDURAL STEROID INJECTION N/A 5/24/2022     Procedure: INJECTION, STEROID, EPIDURAL, L5/S1 IL CONTRAST DIRECT REF;  Surgeon: Angelica Pedraza MD;  Location: [facility]IN [facility]GT;  Service: Pain Management;  Laterality: N/A;    EPIDURAL STEROID INJECTION N/A 10/25/2022     Procedure: LUMBAR L5/S1 IL DARIEL CONTRAST DIRECT REFERRAL;  Surgeon: Angelica Pedraza MD;  Location: [facility];  Service: Pain Management;  Laterality: N/A;      Family History    None         Social History            Socioeconomic History    Marital status: Unknown   Tobacco Use    Smoking status: Never    Smokeless tobacco: Never   Substance and Sexual Activity    Alcohol use: Never    Drug use: Never    Sexual activity: Yes       Partners: Male   Social History Narrative     ** Merged History Encounter **               Review of Systems     OBJECTIVE:      Vital Signs  Pain Score: 10-Worst pain ever  There is no height or weight on file to calculate BMI.     Neurosurgery Physical Exam     Patient is awake alert appropriate  His cranial nerves are intact     He is a positive straight leg raise on the left  He is 5/5 throughout all muscle groups tested except that the left extensor hallucis and tibialis anterior as well as gastrocs appear to be little weak  Discussed decreased sensation left L5 distribution     Diagnostic Results:     MRI scan of lumbar spine  L 3 L4 minimal mild posterior disc bulge, prominent facet joint and ligamentum flavum hypertrophy with narrowing of lateral recesses, severe spinal and moderate foramen stenosis.     L4-5 moderate posterior disc bulge, posterior central annular fissure without evidence of superimposed central left paracentral disc prolapse, significant compression narrowing lateral recesses particularly on left, prompt facet joint  arthropathy, hypertrophy ligamentum flavum, left synovial facet cyst of 2 x 6 mm size, severe spinal and mild moderate foramen stenosis.        ASSESSMENT/PLAN:      57-year-old with significant focal stenosis at L4-5 with a left paracentral disc herniation as well as synovial cyst causing both central and foraminal stenosis.  He is failed conservative management and he is developing motor weakness.  This stage I think he would benefit from surgery.  I would recommend a hemilaminectomy decompression of central canal as was a medial facetectomy and resection of the synovial cyst with microsurgical techniques.     I have discussed the risks/benefits, indications, and alternatives for the proposed procedure in detail. I have answered all of their questions and patient wish to proceed with surgery. We will schedule patient.               Note dictated with voice recognition software, please excuse any grammatical errors.            Electronically signed by Manny Stanley MD at 1/4/2023 11:19 AM

## 2023-01-12 NOTE — BRIEF OP NOTE
[facility] - Surgery (2nd Fl)  Brief Operative Note    SUMMARY     Surgery Date: 1/12/2023     Surgeon(s) and Role:     * Manny Stanley MD - Primary     * Jihan Zavala MD - Resident - Assisting     * Dominick Fabian MD - Resident - Assisting        Pre-op Diagnosis:  HNP (herniated nucleus pulposus), lumbar [M51.26]    Post-op Diagnosis:  Post-Op Diagnosis Codes:     * HNP (herniated nucleus pulposus), lumbar [M51.26]    Procedure(s) (LRB):  LAMINECTOMY, SPINE, MINIMALLY INVASIVE L L4-5 (Left)    Anesthesia: General    Operative Findings: L4/5 MIS laminectomy/discectomy & L L4/5 synovial cyst resection    Estimated Blood Loss: 20cc         Specimens:   Specimen (24h ago, onward)      None            [id number]

## 2023-01-12 NOTE — OP NOTE
[facility] ([facility])  Neurosurgery  Operative Note    OP Note      Date of Procedure: 1/12/2023       Pre-Operative Diagnosis: HNP (herniated nucleus pulposus), lumbar [M51.26] and lumbar stenosis    Post-Operative Diagnosis: Post-Op Diagnosis Codes:     * HNP (herniated nucleus pulposus), lumbar [M51.26]  and lumbar stenosis    Anesthesia: General    Procedures performed:1.  Minimally invasive bilateral decompressive laminectomy for central stenosis decompression 2.  Left-sided medial facetectomy foraminotomy and microdiskectomy 3. Use of microsurgical technique      Surgeon: Manny Stanley MD    Assistant:: Dominick Fabian MD  and Jihan Zavala MD    Indication for Procedure:  This is a 58-year-old male with clinical evidence of neurogenic claudication from central stenosis as well as left-sided radiculopathy from a paracentral disc herniation.  Patient failed conservative management so we felt patient would benefit from surgical intervention    Operative Note:  Patient was anesthetized intubated by anesthesia.  Was placed in a prone position.  Back was prepped and draped sterile fashion.  Fluoroscopy was used to localize the appropriate level.  A 2 cm left paramedian skin incision was made over the disc space of L4-L5.  We used a 18 mm metrics tube system dilated to 18 mm 5. Tube.  This was parked over the inferior aspect of the L4 lamina on the left.  Microscope was brought in the field under microsurgical techniques soft tissue was removed and then a high-speed drill was used to perform ipsilateral laminectomy and then drilled across the contralateral laminectomy took down ligamentum flavum to be able to perform a central decompression we then performed a medial facetectomy on the left side after the central decompression and thecal sac was performed however upon completion of the medial facetectomy and foraminotomy notice some clear fluid coming from the superior edge of the of the dura.  Pushing dura back  we saw a small linear sharp durotomy on the dorsal aspect of the dura.  We used a six 0 [place]-Oswald suture to close it primarily with a figure-eight stitch.  Once we are happy with the dura repair we placed some Surgicel on top of that and then mobilized laterally turned our attention to the foramen we performed a foraminotomy followed out the disc space out laterally.  We are able to mobilize the thecal sac and nerve root medially we coagulated epidural venous bleeding then found the 4 5 disc space.  We then stimulated to make sure there was no nerve roots in the area then used a 15 blade to get into the disc annulus then we used microsurgical technique with downgoing curettes pituitaries and Kerrisons to form a micro diskectomy once were happy with the microdiskectomy we then were able to thoroughly decompress the lateral gutters.  Once were happy with this we coagulated the annulus placed some FloSeal Depo-Medrol and exposed nerve root remove the metrics tube and closed the wound primarily.  Neuro monitor was stable throughout the case.  Dermabond glue and a sterile dressing put in place.  Patient was extubated brought to recovery room without any problems or complication.    EBL:  Minimal  Specimen Sent:  Disc material

## 2023-01-12 NOTE — ANESTHESIA PROCEDURE NOTES
Intubation    Date/Time: 1/12/2023 7:23 AM  Performed by: Renzo Keller CRNA  Authorized by: Conrado Pena MD     Intubation:     Induction:  Intravenous    Intubated:  Postinduction    Mask Ventilation:  Easy mask    Attempts:  1    Attempted By:  CRNA    Method of Intubation:  Direct    Blade:  Olsen 2    Laryngeal View Grade: Grade I - full view of cords      Difficult Airway Encountered?: No      Complications:  None    Airway Device:  Oral endotracheal tube    Airway Device Size:  7.5    Style/Cuff Inflation:  Cuffed (inflated to minimal occlusive pressure)    Tube secured:  22    Secured at:  The lips    Placement Verified By:  Capnometry    Complicating Factors:  None    Findings Post-Intubation:  BS equal bilateral and atraumatic/condition of teeth unchanged

## 2023-01-12 NOTE — ANESTHESIA POSTPROCEDURE EVALUATION
Anesthesia Post Evaluation    Patient: Juanita Valverde    Procedure(s) Performed: Procedure(s) (LRB):  LAMINECTOMY, SPINE, MINIMALLY INVASIVE L L4-5 (Left)    Final Anesthesia Type: general      Patient location during evaluation: PACU  Patient participation: Yes- Able to Participate  Level of consciousness: awake and alert  Post-procedure vital signs: reviewed and stable  Pain management: adequate  Airway patency: patent    PONV status at discharge: No PONV  Anesthetic complications: no      Cardiovascular status: blood pressure returned to baseline  Respiratory status: unassisted  Hydration status: euvolemic  Follow-up not needed.          Vitals Value Taken Time   /78 01/12/23 1231   Temp 36.6 °C (97.9 °F) 01/12/23 1131   Pulse 69 01/12/23 1231   Resp 20 01/12/23 1231   SpO2 97 % 01/12/23 1231   Vitals shown include unvalidated device data.      No case tracking events are documented in the log.      Pain/Petr Score: Pain Rating Prior to Med Admin: 6 (1/12/2023 12:13 PM)  Petr Score: 10 (1/12/2023 12:00 PM)         Letter placed at the  Evelina hardin .

## 2023-01-13 VITALS
OXYGEN SATURATION: 94 % | SYSTOLIC BLOOD PRESSURE: 138 MMHG | TEMPERATURE: 98 F | HEIGHT: 69 IN | RESPIRATION RATE: 18 BRPM | HEART RATE: 67 BPM | DIASTOLIC BLOOD PRESSURE: 68 MMHG | BODY MASS INDEX: 26.51 KG/M2 | WEIGHT: 179 LBS

## 2023-01-13 PROBLEM — M51.26 HNP (HERNIATED NUCLEUS PULPOSUS), LUMBAR: Status: ACTIVE | Noted: 2023-01-13

## 2023-01-13 LAB
ANION GAP SERPL CALC-SCNC: 8 MMOL/L (ref 8–16)
BASOPHILS # BLD AUTO: 0.02 K/UL (ref 0–0.2)
BASOPHILS NFR BLD: 0.1 % (ref 0–1.9)
BUN SERPL-MCNC: 12 MG/DL (ref 6–20)
CALCIUM SERPL-MCNC: 8.8 MG/DL (ref 8.7–10.5)
CHLORIDE SERPL-SCNC: 108 MMOL/L (ref 95–110)
CO2 SERPL-SCNC: 22 MMOL/L (ref 23–29)
CREAT SERPL-MCNC: 0.8 MG/DL (ref 0.5–1.4)
DIFFERENTIAL METHOD: ABNORMAL
EOSINOPHIL # BLD AUTO: 0 K/UL (ref 0–0.5)
EOSINOPHIL NFR BLD: 0 % (ref 0–8)
ERYTHROCYTE [DISTWIDTH] IN BLOOD BY AUTOMATED COUNT: 12.2 % (ref 11.5–14.5)
EST. GFR  (NO RACE VARIABLE): >60 ML/MIN/1.73 M^2
GLUCOSE SERPL-MCNC: 129 MG/DL (ref 70–110)
HCT VFR BLD AUTO: 40 % (ref 40–54)
HGB BLD-MCNC: 13.6 G/DL (ref 14–18)
IMM GRANULOCYTES # BLD AUTO: 0.1 K/UL (ref 0–0.04)
IMM GRANULOCYTES NFR BLD AUTO: 0.7 % (ref 0–0.5)
LYMPHOCYTES # BLD AUTO: 1.2 K/UL (ref 1–4.8)
LYMPHOCYTES NFR BLD: 7.8 % (ref 18–48)
MCH RBC QN AUTO: 32.5 PG (ref 27–31)
MCHC RBC AUTO-ENTMCNC: 34 G/DL (ref 32–36)
MCV RBC AUTO: 96 FL (ref 82–98)
MONOCYTES # BLD AUTO: 1.2 K/UL (ref 0.3–1)
MONOCYTES NFR BLD: 8 % (ref 4–15)
NEUTROPHILS # BLD AUTO: 12.8 K/UL (ref 1.8–7.7)
NEUTROPHILS NFR BLD: 83.4 % (ref 38–73)
NRBC BLD-RTO: 0 /100 WBC
PLATELET # BLD AUTO: 243 K/UL (ref 150–450)
PMV BLD AUTO: 10.6 FL (ref 9.2–12.9)
POTASSIUM SERPL-SCNC: 3.8 MMOL/L (ref 3.5–5.1)
RBC # BLD AUTO: 4.18 M/UL (ref 4.6–6.2)
SODIUM SERPL-SCNC: 138 MMOL/L (ref 136–145)
WBC # BLD AUTO: 15.32 K/UL (ref 3.9–12.7)

## 2023-01-13 PROCEDURE — 99024 POSTOP FOLLOW-UP VISIT: CPT | Mod: ,,, | Performed by: PHYSICIAN ASSISTANT

## 2023-01-13 PROCEDURE — 36415 COLL VENOUS BLD VENIPUNCTURE: CPT | Performed by: STUDENT IN AN ORGANIZED HEALTH CARE EDUCATION/TRAINING PROGRAM

## 2023-01-13 PROCEDURE — 97165 OT EVAL LOW COMPLEX 30 MIN: CPT

## 2023-01-13 PROCEDURE — 25000003 PHARM REV CODE 250

## 2023-01-13 PROCEDURE — 80048 BASIC METABOLIC PNL TOTAL CA: CPT | Performed by: STUDENT IN AN ORGANIZED HEALTH CARE EDUCATION/TRAINING PROGRAM

## 2023-01-13 PROCEDURE — 85025 COMPLETE CBC W/AUTO DIFF WBC: CPT | Performed by: STUDENT IN AN ORGANIZED HEALTH CARE EDUCATION/TRAINING PROGRAM

## 2023-01-13 PROCEDURE — 25000003 PHARM REV CODE 250: Performed by: STUDENT IN AN ORGANIZED HEALTH CARE EDUCATION/TRAINING PROGRAM

## 2023-01-13 PROCEDURE — 63600175 PHARM REV CODE 636 W HCPCS: Performed by: STUDENT IN AN ORGANIZED HEALTH CARE EDUCATION/TRAINING PROGRAM

## 2023-01-13 PROCEDURE — 25000003 PHARM REV CODE 250: Performed by: PHYSICIAN ASSISTANT

## 2023-01-13 PROCEDURE — 99024 PR POST-OP FOLLOW-UP VISIT: ICD-10-PCS | Mod: ,,, | Performed by: PHYSICIAN ASSISTANT

## 2023-01-13 RX ORDER — LIDOCAINE 50 MG/G
PATCH CUTANEOUS DAILY
Qty: 14 PATCH | Refills: 0 | Status: SHIPPED | OUTPATIENT
Start: 2023-01-13 | End: 2023-01-26 | Stop reason: SDUPTHER

## 2023-01-13 RX ORDER — LIDOCAINE 50 MG/G
2 PATCH TOPICAL
Status: DISCONTINUED | OUTPATIENT
Start: 2023-01-13 | End: 2023-01-13 | Stop reason: HOSPADM

## 2023-01-13 RX ORDER — METHOCARBAMOL 750 MG/1
750 TABLET, FILM COATED ORAL 4 TIMES DAILY PRN
Qty: 90 TABLET | Refills: 0 | Status: SHIPPED | OUTPATIENT
Start: 2023-01-13 | End: 2023-06-06 | Stop reason: SDUPTHER

## 2023-01-13 RX ORDER — POLYETHYLENE GLYCOL 3350 17 G/17G
17 POWDER, FOR SOLUTION ORAL 2 TIMES DAILY PRN
Refills: 0 | COMMUNITY
Start: 2023-01-13 | End: 2023-04-21 | Stop reason: CLARIF

## 2023-01-13 RX ORDER — OXYCODONE HYDROCHLORIDE 5 MG/1
5 TABLET ORAL EVERY 4 HOURS PRN
Status: DISCONTINUED | OUTPATIENT
Start: 2023-01-13 | End: 2023-01-13 | Stop reason: HOSPADM

## 2023-01-13 RX ORDER — OXYCODONE HYDROCHLORIDE 10 MG/1
10 TABLET ORAL EVERY 4 HOURS PRN
Status: DISCONTINUED | OUTPATIENT
Start: 2023-01-13 | End: 2023-01-13 | Stop reason: HOSPADM

## 2023-01-13 RX ORDER — HYDROCODONE BITARTRATE AND ACETAMINOPHEN 5; 325 MG/1; MG/1
1 TABLET ORAL EVERY 6 HOURS PRN
Qty: 56 TABLET | Refills: 0 | Status: SHIPPED | OUTPATIENT
Start: 2023-01-13 | End: 2023-01-26 | Stop reason: SDUPTHER

## 2023-01-13 RX ORDER — METHOCARBAMOL 750 MG/1
750 TABLET, FILM COATED ORAL 4 TIMES DAILY
Status: DISCONTINUED | OUTPATIENT
Start: 2023-01-13 | End: 2023-01-13 | Stop reason: HOSPADM

## 2023-01-13 RX ADMIN — MORPHINE SULFATE 1 MG: 2 INJECTION, SOLUTION INTRAMUSCULAR; INTRAVENOUS at 02:01

## 2023-01-13 RX ADMIN — OXYCODONE HYDROCHLORIDE 5 MG: 5 TABLET ORAL at 08:01

## 2023-01-13 RX ADMIN — METHOCARBAMOL 500 MG: 500 TABLET ORAL at 08:01

## 2023-01-13 RX ADMIN — ACETAMINOPHEN 1000 MG: 500 TABLET ORAL at 08:01

## 2023-01-13 RX ADMIN — AMLODIPINE BESYLATE AND BENAZEPRIL HYDROCHLORIDE 1 CAPSULE: 5; 20 CAPSULE ORAL at 10:01

## 2023-01-13 RX ADMIN — CEFAZOLIN 2 G: 2 INJECTION, POWDER, FOR SOLUTION INTRAMUSCULAR; INTRAVENOUS at 01:01

## 2023-01-13 RX ADMIN — GABAPENTIN 300 MG: 300 CAPSULE ORAL at 08:01

## 2023-01-13 RX ADMIN — HEPARIN SODIUM 5000 UNITS: 5000 INJECTION INTRAVENOUS; SUBCUTANEOUS at 06:01

## 2023-01-13 RX ADMIN — MORPHINE SULFATE 1 MG: 2 INJECTION, SOLUTION INTRAMUSCULAR; INTRAVENOUS at 04:01

## 2023-01-13 RX ADMIN — LIDOCAINE 2 PATCH: 50 PATCH CUTANEOUS at 10:01

## 2023-01-13 NOTE — HOSPITAL COURSE
The patient [place] [facility] [place] for the above stated procedure. The patient tolerated the procedure well and there were no intra-operative complications. After surgery, the patient was extubated and taken to recovery in stable condition.  After observation in recovery, the patient was transferred to the neurosurgical floor.   On 1/13/23 he was discharged home with pain medication and follow up appointments. The patient was tolerating a regular diet and voiding without difficulty. Activity as tolerated. At the time of discharge, the patient was neurologically stable, was afebrile, and vital signs were stable. Discharge instructions were given verbally/written to the patient and their family, including wound care and follow-up appointments, and all of their questions were answered. The patient was also given a discharge instruction sheet explaining the aforementioned discussion.  The patient verbalized understanding of instructions and agreed to the plan. They were encouraged to call the clinic with any questions they might have prior to the follow up appointments.      Physical Exam 1/13/23:  General: well developed, well nourished, no distress.   Head: normocephalic, atraumatic  Neurologic: Alert and oriented. Thought content appropriate.  Cranial nerves: face symmetric, tongue midline, CN II-XII grossly intact.   Eyes: pupils equal, round, reactive to light with accommodation, EOMI.   Pulmonary: normal respirations, no signs of respiratory distress  Skin: Skin is warm, dry and intact.  Sensory: intact to light touch throughout  Motor Strength:Moves all extremities spontaneously with good tone.  Full strength upper and lower extremities. No abnormal movements seen.     Strength  Deltoids Triceps Biceps Wrist Extension Wrist Flexion Hand    Upper: R 5/5 5/5 5/5 5/5 5/5 5/5    L 5/5 5/5 5/5 5/5 5/5 5/5     Iliopsoas Quadriceps Knee  Flexion Tibialis  anterior Gastro- cnemius EHL   Lower: R 5/5 5/5 5/5 5/5  5/5 5/5    L 5/5 5/5 5/5 5/5 5/5 5/5     Lumbar incision is clean and dry with dermabond intact

## 2023-01-13 NOTE — DISCHARGE SUMMARY
[facility] - Neurosurgery ([facility])  Neurosurgery  Discharge Summary      Patient Name: Juanita Valverde  MRN: [id number]  Admission Date: 1/12/2023  Hospital Length of Stay: 1 days  Discharge Date and Time: 1/13/2023 11:28 AM  Attending Physician: Joceline somers. providers found   Discharging Provider: Marcela Hughes PA-C  Primary Care Provider: Sophy Haddad Mai, MD    HPI:   This is a 58-year-old male with clinical evidence of neurogenic claudication from central stenosis as well as left-sided radiculopathy from a paracentral disc herniation.  Patient failed conservative management so we felt patient would benefit from surgical intervention. He presents today for elective L4-5 MIS laminectomy      Procedure(s) (LRB):  LAMINECTOMY, SPINE, MINIMALLY INVASIVE L L4-5 (Left)     Hospital Course:   The patient presented to [facility] on 1/12/23 for the above stated procedure. The patient tolerated the procedure well and there were no intra-operative complications. After surgery, the patient was extubated and taken to recovery in stable condition.  After observation in recovery, the patient was transferred to the neurosurgical floor.   On 1/13/23 he was discharged home with pain medication and follow up appointments. The patient was tolerating a regular diet and voiding without difficulty. Activity as tolerated. At the time of discharge, the patient was neurologically stable, was afebrile, and vital signs were stable. Discharge instructions were given verbally/written to the patient and their family, including wound care and follow-up appointments, and all of their questions were answered. The patient was also given a discharge instruction sheet explaining the aforementioned discussion.  The patient verbalized understanding of instructions and agreed to the plan. They were encouraged to call the clinic with any questions they might have prior to the follow up appointments.      Physical Exam 1/13/23:  General: well developed, well nourished, no  distress.   Head: normocephalic, atraumatic  Neurologic: Alert and oriented. Thought content appropriate.  Cranial nerves: face symmetric, tongue midline, CN II-XII grossly intact.   Eyes: pupils equal, round, reactive to light with accommodation, EOMI.   Pulmonary: normal respirations, no signs of respiratory distress  Skin: Skin is warm, dry and intact.  Sensory: intact to light touch throughout  Motor Strength:Moves all extremities spontaneously with good tone.  Full strength upper and lower extremities. No abnormal movements seen.     Strength  Deltoids Triceps Biceps Wrist Extension Wrist Flexion Hand    Upper: R 5/5 5/5 5/5 5/5 5/5 5/5    L 5/5 5/5 5/5 5/5 5/5 5/5     Iliopsoas Quadriceps Knee  Flexion Tibialis  anterior Gastro- cnemius EHL   Lower: R 5/5 5/5 5/5 5/5 5/5 5/5    L 5/5 5/5 5/5 5/5 5/5 5/5     Lumbar incision is clean and dry with dermabond intact               Goals of Care Treatment Preferences:         Consults:     Significant Diagnostic Studies: Labs:   BMP:   Recent Labs   Lab 01/13/23  0309   *      K 3.8      CO2 22*   BUN 12   CREATININE 0.8   CALCIUM 8.8    and CBC   Recent Labs   Lab 01/13/23  0309   WBC 15.32*   HGB 13.6*   HCT 40.0          Pending Diagnostic Studies:     None        Final Active Diagnoses:    Diagnosis Date Noted POA    PRINCIPAL PROBLEM:  HNP (herniated nucleus pulposus), lumbar [M51.26] 01/13/2023 Yes      Problems Resolved During this Admission:      Discharged Condition: stable     Disposition: Home or Self Care    Follow Up:  Future Appointments   Date Time Provider Department Center   1/26/2023 10:00 AM Giovanna Akhtar RN [facility]   2/28/2023  9:45 AM Manny Stanley MD [facility]         Patient Instructions:      Notify your health care provider if you experience any of the following:  increased confusion or weakness     Notify your health care provider if you experience any of the following:   persistent dizziness, light-headedness, or visual disturbances     Notify your health care provider if you experience any of the following:  worsening rash     Notify your health care provider if you experience any of the following:  severe persistent headache     Notify your health care provider if you experience any of the following:  difficulty breathing or increased cough     Notify your health care provider if you experience any of the following:  redness, tenderness, or signs of infection (pain, swelling, redness, odor or green/yellow discharge around incision site)     Notify your health care provider if you experience any of the following:  severe uncontrolled pain     Notify your health care provider if you experience any of the following:  persistent nausea and vomiting or diarrhea     Notify your health care provider if you experience any of the following:  temperature >100.4     Activity as tolerated     Medications:  Reconciled Home Medications:      Medication List      START taking these medications    HYDROcodone-acetaminophen 5-325 mg per tablet  Commonly known as: NORCO  Take 1 tablet by mouth every 6 (six) hours as needed for Pain.     LIDOcaine 5 %  Commonly known as: LIDODERM  Place onto the skin once daily. Remove & Discard patch within 12 hours or as directed by MD     methocarbamoL 750 MG Tab  Commonly known as: ROBAXIN  Take 1 tablet (750 mg total) by mouth 4 (four) times daily as needed (muscle spasms).     polyethylene glycol 17 gram Pwpk  Commonly known as: GLYCOLAX  Take 17 g by mouth 2 (two) times daily as needed (constipation).        CHANGE how you take these medications    gabapentin 300 MG capsule  Commonly known as: NEURONTIN  Take 1 capsule (300 mg total) by mouth 3 (three) times daily.  What changed: Another medication with the same name was removed. Continue taking this medication, and follow the directions you see here.        CONTINUE taking these medications    acetaminophen  325 MG tablet  Commonly known as: TYLENOL  Take 325 mg by mouth every 6 (six) hours as needed for Pain.     amlodipine-benazepril 10-20mg 10-20 mg per capsule  Commonly known as: LOTREL  Take 1 capsule by mouth once daily.     atorvastatin 10 MG tablet  Commonly known as: LIPITOR  Take 10 mg by mouth nightly.     diclofenac sodium 1 % Gel  Commonly known as: VOLTAREN  Apply 2 g topically 3 (three) times daily.     meloxicam 15 MG tablet  Commonly known as: MOBIC  Take 1 tablet (15 mg total) by mouth daily as needed for Pain (back pain).     traMADoL 50 mg tablet  Commonly known as: ULTRAM  Take 50 mg by mouth 3 (three) times daily as needed.            Marcela Hughes PA-C  Neurosurgery  [facility] - Neurosurgery [facility])

## 2023-01-13 NOTE — PLAN OF CARE
Problem: Occupational Therapy  Goal: Occupational Therapy Goal  Description: Goals to be met by: 01-13-23     Patient will increase functional independence with ADLs by performing:    Pt. To be able to recall spinal precautions  MET  Pt. To demonstrate independence with bed mobility, self-care tasks, and mobility  MET    Outcome: Met   OT evaluation completed. No further OT services needed.

## 2023-01-13 NOTE — HPI
This is a 58-year-old male with clinical evidence of neurogenic claudication from central stenosis as well as left-sided radiculopathy from a paracentral disc herniation.  Patient failed conservative management so we felt patient would benefit from surgical intervention. He presents today for elective L4-5 MIS laminectomy

## 2023-01-13 NOTE — NURSING TRANSFER
Nursing Transfer Note      1/12/2023     Reason patient is being transferred: post op    Transfer To: 926    Transfer via bed    Transfer with IVF    Transported by pct    Medicines sent: ivf    Any special needs or follow-up needed: routine    Chart send with patient: Yes    Notified: spouse    Patient reassessed at: 2000

## 2023-01-13 NOTE — PLAN OF CARE
Problem: Adult Inpatient Plan of Care  Goal: Plan of Care Review  Outcome: Ongoing, Progressing     Problem: Adult Inpatient Plan of Care  Goal: Optimal Comfort and Wellbeing  Outcome: Ongoing, Progressing     Problem: Adult Inpatient Plan of Care  Goal: Readiness for Transition of Care  Outcome: Ongoing, Progressing   Patient has been awake quite a bit this shift, has to lay flat till 0945, repositioned to fix chucks and back to laying flat. Bed in lowest position, SR's up times 4 and bed alarm activated for safety purposes. On call paged for muscle relaxer and/or increase in pain meds d/t pain increasing d/t decreased movement. Secure chatted awaiting answer. IV abt's continued and he is afebrile. Uses urinal at bedside. VSS, flow sheets completed see for assessments.

## 2023-01-13 NOTE — PT/OT/SLP EVAL
Occupational Therapy   Evaluation/d/c    Name: Juanita Valverde  MRN: [id number]  Admitting Diagnosis: HNP (herniated nucleus pulposus), lumbar  Recent Surgery: Procedure(s) (LRB):  LAMINECTOMY, SPINE, MINIMALLY INVASIVE L L4-5 (Left) 1 Day Post-Op    Recommendations:     Discharge Recommendations: home  Discharge Equipment Recommendations:  none  Barriers to discharge:  None    Assessment:     Juanita Valverde is a 58 y.o. male with a medical diagnosis of HNP (herniated nucleus pulposus), lumbar.  He presents with high level  of functioning with ADL tasks/mobility and minimal discomfort. Pt. Ambulated without an AD in hallway with Supervision. Pt. Demonstrated good understanding of spinal precautions. Pt. Does not require continued OT services.pt. in agreement and anxious to return home.       Rehab Prognosis: Good; Pt. Does not require continued OT services  Plan:     Patient to be d/c'd from acute OT services  Plan of Care Expires: 02/12/23  Plan of Care Reviewed with: patient, spouse    Subjective     Chief Complaint: No complaints. Anxious to return home.   Patient/Family Comments/goals: to return home.     Occupational Profile:  Living Environment: pt. Resides in a ss house with no steps with spouse  Previous level of function: Pt. Was able to ambulate without an AD and perform ADL tasks  Roles and Routines: caretaker of self, father, spouse, semiretired  Equipment Used at Home: none  Assistance upon Discharge: family if needed    Pain/Comfort:  Pain Rating 1: 2/10  Location 1: back  Pain Addressed 1: Reposition, Distraction  Pain Rating Post-Intervention 1: 1/10    Patients cultural, spiritual, Restorationism conflicts given the current situation: no    Objective:     Communicated with: nurse prior to session.  Patient found  seated on sofa  with  (no active lines) upon OT entry to room. Family member present    General Precautions: Standard,  (standard)  Orthopedic Precautions: spinal precautions  Braces: N/A  Respiratory Status:  Room air    Occupational Performance:    Bed Mobility:    Patient completed Rolling/Turning to Right with modified independence log roll technique  Patient completed Supine to Sit with modified independence  Patient completed Sit to Supine with modified independence    Functional Mobility/Transfers:  Patient completed Sit <> Stand Transfer with modified independence  with  no assistive device   Patient completed Toilet Transfer Stand Pivot technique with supervision with  no AD  Functional Mobility: Pt. Ambulated in hallway without an AD and Supervision    Activities of Daily Living:  Upper Body Dressing: independence to don gown like robe  Lower Body Dressing: independence to don socks using figure 4 technique    Cognitive/Visual Perceptual:  Cognitive/Psychosocial Skills:     -       Oriented to: Person, Place, Time, and Situation   -       Follows Commands/attention:Follows multistep  commands  -       Communication: clear/fluent  -       Memory: No Deficits noted  -       Safety awareness/insight to disability: intact   -       Mood/Affect/Coping skills/emotional control: Appropriate to situation  Visual/Perceptual:      -Intact .    Physical Exam:  Balance: -       sit: good; stand: good  Postural examination/scapula alignment:    -       No postural abnormalities identified  Skin integrity: Visible skin intact  Dominant hand: -       right  Upper Extremity Range of Motion:     -       Right Upper Extremity: WNL  -       Left Upper Extremity: WNL   Strength:    -       Right Upper Extremity: WNL  -       Left Upper Extremity: WNL    AMPAC 6 Click ADL:  AMPAC Total Score: 23    Treatment & Education:  Pt. Educated on safety with mobility and spinal precautions with good understanding noted.     Patient left  seated on sofa  with  spouse present    GOALS:   Multidisciplinary Problems       Occupational Therapy Goals       Not on file              Multidisciplinary Problems (Resolved)          Problem:  Occupational Therapy    Goal Priority Disciplines Outcome Interventions   Occupational Therapy Goal   (Resolved)     OT, PT/OT Met    Description: Goals to be met by: 01-13-23     Patient will increase functional independence with ADLs by performing:    Pt. To be able to recall spinal precautions  MET  Pt. To demonstrate independence with bed mobility, self-care tasks, and mobility  MET                         History:     Past Medical History:   Diagnosis Date    Hyperlipidemia     Hypertension          Past Surgical History:   Procedure Laterality Date    EPIDURAL STEROID INJECTION N/A 5/24/2022    Procedure: INJECTION, STEROID, EPIDURAL, L5/S1 IL CONTRAST DIRECT REF;  Surgeon: Angelica Pedraza MD;  Location: [facility];  Service: Pain Management;  Laterality: N/A;    EPIDURAL STEROID INJECTION N/A 10/25/2022    Procedure: LUMBAR L5/S1 IL DARIEL CONTRAST DIRECT REFERRAL;  Surgeon: Angelica Pedraza MD;  Location: [facility];  Service: Pain Management;  Laterality: N/A;    LAMINECTOMY USING MINIMALLY INVASIVE TECHNIQUE Left 1/12/2023    Procedure: LAMINECTOMY, SPINE, MINIMALLY INVASIVE L L4-5;  Surgeon: Manny Stanley MD;  Location: [address];  Service: Neurosurgery;  Laterality: Left;  [place] table 4 poster, prone, ayesha, emg       Time Tracking:     OT Date of Treatment: 01/13/23  OT Start Time: 1002  OT Stop Time: 1016  OT Total Time (min): 14 min    Billable Minutes:Evaluation 14    1/13/2023

## 2023-01-13 NOTE — DISCHARGE INSTRUCTIONS
Post op Spine Patient Instructions    Activity Restrictions:  [x]  Return to work will be determined on an individual basis.  [x]  No lifting greater than 5-10 pounds.  [x]  Avoid bending and twisting the area of your surgery more than 45 degrees from neutral position in any direction.  [x]  No driving or operating machinery:  [x]  until cleared by your surgeon.  [x]  while taking narcotic pain medications or muscle relaxants.  [x]  Wear your brace at all times except when lying in bed, showering, eating, using the restroom, or performing hygiene tasks.   [x]  Increase ambulation over the next 2 weeks. Walk on paved surfaces only. It is okay to walk up and down stairs while holding onto a side rail.  [x]  No sexual activity for 2 weeks.    Discharge Medication/Follow-up:  [x]  Please refer to discharge medication reconciliation form.  [x]  Do not take any OTC products containing acetaminophen (tylenol) at the same time as you take your narcotic pain medication. Medications that may contain acetaminophen include but are not limited to: Excedrin and other headache medications, arthritis medications, cold and sinus medications, etc. Please review the list of active ingredients in any OTC medication prior to taking it.  [x]  Do not take ANY Aspirin or Aspirin containing products for 2 weeks after surgery (unless otherwise directed in discharge medication list).   [x]  Do not take ANY herbal supplements for 2 weeks after surgery.    [x]  Do not take ANY non-steroidal anti-inflammatory drugs (NSAIDS), including the following: ibuprofen, naprosyn, Aleve, Advil, Indocin, Mobic, or Celebrex for 12 weeks after surgery.   [x]  Prescriptions for appropriate medication will be given upon discharge.  [x]  Take the pain medication as prescribed. We recommend to wean use of your pain medication after 1 week of taking as prescribed (Ex: After 1 week of taking every 4 hours as needed, wean down to taking your pain medication every 6  hours as needed).  [x]  Take docusate (Colace 100 mg): take one capsule a day as needed for constipation. You can get this over the counter.  [x]  Follow-up appointment:  [x]  10-14 days post-op for wound check by physician assistant/nurse  [x]  4-6 weeks with MD:    Future Appointments   Date Time Provider Department Center   1/26/2023 10:00 AM Giovanna Akhtar RN [address]   2/28/2023  9:45 AM Manny Stanley MD [address]         Wound Care:  [x]  Remove the small dressing near your incision in 2 days.   [x]  No bandage required. Keep your incision open to the air. You have dermabond(skin glue) covering your incision. This will begin to flake off over the next 2 weeks. Do not remove this on your own, allow it to peel off. Do not apply ointments or creams to your incision.  [x]  You may shower on the 2nd day after your surgery. Keep the incision clean and dry at all times. Do not allow the force of water to hit the incision. If the incision gets damp, pat it dry. Do not rub or scrub the incision.  [x]  You cannot take a bath until 8 weeks after surgery.      Call your doctor or go to the Emergency Room for any signs of infection, including: increased redness, drainage, pain, or fever (temperature ?101). Call your doctor or go to the Emergency Room if there are any localized neurological changes; problems with speech, vision, numbness, tingling, weakness, or severe headache; inability to control urination or bowel movements; inability to urinate; or for other concerns.        Special Instructions:  [x]  No use of tobacco products.  [x]  Diet: Please eat a regular diet as tolerated.      Physicians need 3 days' notice for pain medicine to be refilled. Pain medicine will only be refilled between 8 AM and 5 PM, Monday through Friday, due to Food and Drug Administration regulation of documentation.    If you have any questions about this form, please call [phone].    Form No. 66384  (Revised 10/31/2013)

## 2023-01-26 ENCOUNTER — CLINICAL SUPPORT (OUTPATIENT)
Dept: NEUROSURGERY | Facility: CLINIC | Age: 58
End: 2023-01-26
Payer: MEDICAID

## 2023-01-26 VITALS — TEMPERATURE: 97 F

## 2023-01-26 DIAGNOSIS — M51.26 HNP (HERNIATED NUCLEUS PULPOSUS), LUMBAR: Primary | ICD-10-CM

## 2023-01-26 PROCEDURE — 99999 PR PBB SHADOW E&M-EST. PATIENT-LVL I: ICD-10-PCS | Mod: PBBFAC,,,

## 2023-01-26 PROCEDURE — 99211 OFF/OP EST MAY X REQ PHY/QHP: CPT | Mod: PBBFAC

## 2023-01-26 PROCEDURE — 99999 PR PBB SHADOW E&M-EST. PATIENT-LVL I: CPT | Mod: PBBFAC,,,

## 2023-01-26 RX ORDER — LIDOCAINE 50 MG/G
PATCH TOPICAL DAILY
Qty: 14 PATCH | Refills: 0 | Status: ON HOLD | OUTPATIENT
Start: 2023-01-26 | End: 2023-04-24 | Stop reason: HOSPADM

## 2023-01-26 RX ORDER — HYDROCODONE BITARTRATE AND ACETAMINOPHEN 5; 325 MG/1; MG/1
1 TABLET ORAL EVERY 8 HOURS PRN
Qty: 40 TABLET | Refills: 0 | Status: ON HOLD | OUTPATIENT
Start: 2023-01-26 | End: 2023-04-24 | Stop reason: HOSPADM

## 2023-01-26 NOTE — PROGRESS NOTES
Patient seen in clinic for 2 week post op s/p L L4-5 Laminectomy with Dr Stanley 01/12/2023. Patient slipped and fell on his back last week, has an abrasion on the incision underneath the dermabond.        Incision on lumbar spine assessed, dermabond used for closure, has an abrasion superior aspect of incision, no swelling, or drainage, edges well approximated.     Patient was instructed as follows:   Discontinue Bacitracin after tonight.  May shower normally but pat dry after shower.  Do not submerge wound in bath tub or go swimming until released by the physician  Keep incision clean, dry and open to air as much as possible.  Patient encouraged to walk as much as possible but advised to walk with family member or friend and rest as necessary.  No lifting >10lbs.  Avoid bending and twisting the area of your surgery more than 45 degrees from neutral position in any direction.    Patient will send me a picture Tuesday so we can keep a close eye on the abrasion.     All questions were answered. Patient will follow up with Dr Stanley 02/28/2023 . Patient was encouraged to call clinic with any future concerns prior to follow up appt. If any worsening symptoms, patient should report to ED.       Giovanna Akhtar RN, BSN  Neurosurgery Nurse Navigator

## 2023-02-28 ENCOUNTER — OFFICE VISIT (OUTPATIENT)
Dept: NEUROSURGERY | Facility: CLINIC | Age: 58
End: 2023-02-28
Payer: MEDICAID

## 2023-02-28 ENCOUNTER — TELEPHONE (OUTPATIENT)
Dept: NEUROSURGERY | Facility: CLINIC | Age: 58
End: 2023-02-28

## 2023-02-28 VITALS — TEMPERATURE: 97 F

## 2023-02-28 DIAGNOSIS — M51.26 HNP (HERNIATED NUCLEUS PULPOSUS), LUMBAR: Primary | ICD-10-CM

## 2023-02-28 DIAGNOSIS — M54.16 LUMBAR RADICULOPATHY, CHRONIC: ICD-10-CM

## 2023-02-28 PROCEDURE — 99024 PR POST-OP FOLLOW-UP VISIT: ICD-10-PCS | Mod: ,,, | Performed by: NEUROLOGICAL SURGERY

## 2023-02-28 PROCEDURE — 99213 OFFICE O/P EST LOW 20 MIN: CPT | Mod: PBBFAC | Performed by: NEUROLOGICAL SURGERY

## 2023-02-28 PROCEDURE — 1159F PR MEDICATION LIST DOCUMENTED IN MEDICAL RECORD: ICD-10-PCS | Mod: CPTII,,, | Performed by: NEUROLOGICAL SURGERY

## 2023-02-28 PROCEDURE — 99999 PR PBB SHADOW E&M-EST. PATIENT-LVL III: ICD-10-PCS | Mod: PBBFAC,,, | Performed by: NEUROLOGICAL SURGERY

## 2023-02-28 PROCEDURE — 99024 POSTOP FOLLOW-UP VISIT: CPT | Mod: ,,, | Performed by: NEUROLOGICAL SURGERY

## 2023-02-28 PROCEDURE — 1159F MED LIST DOCD IN RCRD: CPT | Mod: CPTII,,, | Performed by: NEUROLOGICAL SURGERY

## 2023-02-28 PROCEDURE — 99999 PR PBB SHADOW E&M-EST. PATIENT-LVL III: CPT | Mod: PBBFAC,,, | Performed by: NEUROLOGICAL SURGERY

## 2023-02-28 PROCEDURE — 4010F PR ACE/ARB THEARPY RXD/TAKEN: ICD-10-PCS | Mod: CPTII,,, | Performed by: NEUROLOGICAL SURGERY

## 2023-02-28 PROCEDURE — 4010F ACE/ARB THERAPY RXD/TAKEN: CPT | Mod: CPTII,,, | Performed by: NEUROLOGICAL SURGERY

## 2023-02-28 RX ORDER — METHYLPREDNISOLONE 4 MG/1
TABLET ORAL
Qty: 21 TABLET | Refills: 0 | Status: SHIPPED | OUTPATIENT
Start: 2023-02-28 | End: 2023-04-21 | Stop reason: CLARIF

## 2023-02-28 RX ORDER — OXYCODONE AND ACETAMINOPHEN 5; 325 MG/1; MG/1
1 TABLET ORAL EVERY 6 HOURS PRN
Qty: 30 TABLET | Refills: 0 | Status: ON HOLD | OUTPATIENT
Start: 2023-02-28 | End: 2023-04-24 | Stop reason: HOSPADM

## 2023-02-28 NOTE — PROGRESS NOTES
Patient back to see us follow-up after having undergone a minimally invasive L4-5 decompression and diskectomy on 01/12/2023.  Patient did very well postoperatively had complete resolution of his neurogenic claudication as well as radiculopathy however about 3 or 4 days ago after he worked to help some of the screw at a event that he helped to lift some heavy table.  He did not have immediate pain time but that evening started to have some drainage from the wounds bloody drainage from the wound and increasing pain.  He had some intermittent drainage from the wound for a couple of days but now stopped.  But now he has were neurogenic claudication and some left-sided radiculopathy.  His wound right now looks dry but it is definitely bulging out with the fluid underneath.  He denies any fevers denies any signs symptom of infection.  This stage I worried that he may have had a hematoma after some heavy lifting activities that may be compressive on who nerve root and thecal sac.  We will try Medrol Dosepak but if that does not work then I think we would have to get a repeat imaging to make sure we rule out any underlying infection or hematoma.

## 2023-02-28 NOTE — TELEPHONE ENCOUNTER
Called - no answer. Left VM informing patient of scheduled times and date for imaging and f/u appointment with Dr. Stanley.

## 2023-04-18 ENCOUNTER — HOSPITAL ENCOUNTER (OUTPATIENT)
Dept: RADIOLOGY | Facility: HOSPITAL | Age: 58
Discharge: HOME | End: 2023-04-18
Attending: NEUROLOGICAL SURGERY
Payer: MEDICAID

## 2023-04-18 ENCOUNTER — OFFICE VISIT (OUTPATIENT)
Dept: NEUROSURGERY | Facility: CLINIC | Age: 58
End: 2023-04-18
Payer: MEDICAID

## 2023-04-18 DIAGNOSIS — M54.16 LUMBAR RADICULOPATHY: ICD-10-CM

## 2023-04-18 DIAGNOSIS — M51.26 HNP (HERNIATED NUCLEUS PULPOSUS), LUMBAR: Primary | ICD-10-CM

## 2023-04-18 DIAGNOSIS — M54.16 LUMBAR RADICULOPATHY, CHRONIC: ICD-10-CM

## 2023-04-18 DIAGNOSIS — M54.16 LUMBAR RADICULOPATHY, ACUTE: ICD-10-CM

## 2023-04-18 DIAGNOSIS — M51.26 RECURRENT DISPLACEMENT OF LUMBAR DISC: Primary | ICD-10-CM

## 2023-04-18 PROCEDURE — 99999 PR PBB SHADOW E&M-EST. PATIENT-LVL III: ICD-10-PCS | Mod: PBBFAC,,, | Performed by: NEUROLOGICAL SURGERY

## 2023-04-18 PROCEDURE — 1159F MED LIST DOCD IN RCRD: CPT | Mod: CPTII,,, | Performed by: NEUROLOGICAL SURGERY

## 2023-04-18 PROCEDURE — 72158 MRI LUMBAR SPINE W/O & W/DYE: CPT | Mod: TC

## 2023-04-18 PROCEDURE — 99213 OFFICE O/P EST LOW 20 MIN: CPT | Mod: PBBFAC,25 | Performed by: NEUROLOGICAL SURGERY

## 2023-04-18 PROCEDURE — 4010F PR ACE/ARB THEARPY RXD/TAKEN: ICD-10-PCS | Mod: CPTII,,, | Performed by: NEUROLOGICAL SURGERY

## 2023-04-18 PROCEDURE — 25500020 PHARM REV CODE 255: Performed by: NEUROLOGICAL SURGERY

## 2023-04-18 PROCEDURE — 72158 MRI LUMBAR SPINE W/O & W/DYE: CPT | Mod: 26,,, | Performed by: RADIOLOGY

## 2023-04-18 PROCEDURE — A9585 GADOBUTROL INJECTION: HCPCS | Performed by: NEUROLOGICAL SURGERY

## 2023-04-18 PROCEDURE — 99214 OFFICE O/P EST MOD 30 MIN: CPT | Mod: S$PBB,,, | Performed by: NEUROLOGICAL SURGERY

## 2023-04-18 PROCEDURE — 72158 MRI LUMBAR SPINE W WO CONTRAST: ICD-10-PCS | Mod: 26,,, | Performed by: RADIOLOGY

## 2023-04-18 PROCEDURE — 4010F ACE/ARB THERAPY RXD/TAKEN: CPT | Mod: CPTII,,, | Performed by: NEUROLOGICAL SURGERY

## 2023-04-18 PROCEDURE — 99214 PR OFFICE/OUTPT VISIT, EST, LEVL IV, 30-39 MIN: ICD-10-PCS | Mod: S$PBB,,, | Performed by: NEUROLOGICAL SURGERY

## 2023-04-18 PROCEDURE — 1159F PR MEDICATION LIST DOCUMENTED IN MEDICAL RECORD: ICD-10-PCS | Mod: CPTII,,, | Performed by: NEUROLOGICAL SURGERY

## 2023-04-18 PROCEDURE — 99999 PR PBB SHADOW E&M-EST. PATIENT-LVL III: CPT | Mod: PBBFAC,,, | Performed by: NEUROLOGICAL SURGERY

## 2023-04-18 RX ORDER — NAPROXEN SODIUM 220 MG/1
81 TABLET, FILM COATED ORAL DAILY
Status: ON HOLD | COMMUNITY
End: 2023-04-24 | Stop reason: HOSPADM

## 2023-04-18 RX ORDER — GADOBUTROL 604.72 MG/ML
9 INJECTION INTRAVENOUS
Status: COMPLETED | OUTPATIENT
Start: 2023-04-18 | End: 2023-04-18

## 2023-04-18 RX ADMIN — GADOBUTROL 9 ML: 604.72 INJECTION INTRAVENOUS at 07:04

## 2023-04-18 NOTE — PROGRESS NOTES
Neurosurgery  Established Patient    SUBJECTIVE:     History of Present Illness:  12/20/2022  Patient is here to see me for follow-up after last evaluation patient on 10/11/2022.  This is a 57-year-old male with close to a year history of increasing lower back and bilateral leg pain.  He has signs and symptoms of significant neurogenic claudication would also now has noted some increasing weakness of the left foot.  Patient has treated conservatively for physical therapy without any significant improvement.  He is also had anti-inflammatories gabapentin and muscle relaxants.  He is also had 2 epidural injections.  He is only had temporary relief with epidural injections.  Now he is unable to walk for prolonged period time by normal blocker so.  It is having subtle signs of possible left foot weakness.  He denies any bowel bladder issues.      Interval history 4/18/2023  Since last visit, patient had left L4-L5 microdiscectomy and had immediate relief of left sided radiculopathic pain. He was doing very well after discharge, but then had a slip and fall about 4-5 days after surgery. He again had another incident while trying to lift heavy objects at work in February. After these events, he developed bilateral lower extremity pain, sore/achy pain down the thighs to the calf, some days on the right, some days on the left, worse with standing and walking after some time, better with leaning forward. He notices some weakness in his thighs when he stands up from a squat. The pain is affecting his normal daily life, and he is having to take daily pain medication including a medrol dose kisha he was prescribed in February which provided him with significant relief     Review of patient's allergies indicates:  No Known Allergies    Current Outpatient Medications   Medication Sig Dispense Refill    acetaminophen (TYLENOL) 325 MG tablet Take 325 mg by mouth every 6 (six) hours as needed for Pain.      amlodipine-benazepril  10-20mg (LOTREL) 10-20 mg per capsule Take 1 capsule by mouth once daily.      aspirin 81 MG Chew Take 81 mg by mouth once daily.      atorvastatin (LIPITOR) 10 MG tablet Take 10 mg by mouth nightly.      diclofenac sodium (VOLTAREN) 1 % Gel Apply 2 g topically 3 (three) times daily. 200 g 5    gabapentin (NEURONTIN) 300 MG capsule Take 1 capsule (300 mg total) by mouth 3 (three) times daily. 90 capsule 3    HYDROcodone-acetaminophen (NORCO) 5-325 mg per tablet Take 1 tablet by mouth every 8 (eight) hours as needed for Pain. 40 tablet 0    meloxicam (MOBIC) 15 MG tablet Take 1 tablet (15 mg total) by mouth daily as needed for Pain (back pain). 30 tablet 0    methocarbamoL (ROBAXIN) 750 MG Tab Take 1 tablet (750 mg total) by mouth 4 (four) times daily as needed (muscle spasms). 90 tablet 0    LIDOcaine (LIDODERM) 5 % Place onto the skin once daily. Remove & Discard patch within 12 hours or as directed by MD (Patient not taking: Reported on 4/18/2023) 14 patch 0    methylPREDNISolone (MEDROL DOSEPACK) 4 mg tablet use as directed 21 tablet 0    oxyCODONE-acetaminophen (PERCOCET) 5-325 mg per tablet Take 1 tablet by mouth every 6 (six) hours as needed for Pain. 30 tablet 0    polyethylene glycol (GLYCOLAX) 17 gram PwPk Take 17 g by mouth 2 (two) times daily as needed (constipation). (Patient not taking: Reported on 2/28/2023)  0    traMADoL (ULTRAM) 50 mg tablet Take 50 mg by mouth 3 (three) times daily as needed.       No current facility-administered medications for this visit.       Past Medical History:   Diagnosis Date    Hyperlipidemia     Hypertension      Past Surgical History:   Procedure Laterality Date    EPIDURAL STEROID INJECTION N/A 5/24/2022    Procedure: INJECTION, STEROID, EPIDURAL, L5/S1 IL CONTRAST DIRECT REF;  Surgeon: Angelica Pedraza MD;  Location: [facility];  Service: Pain Management;  Laterality: N/A;    EPIDURAL STEROID INJECTION N/A 10/25/2022    Procedure: LUMBAR L5/S1 IL DARIEL CONTRAST  DIRECT REFERRAL;  Surgeon: Angelica Pedraza MD;  [facility];  Service: Pain Management;  Laterality: N/A;    LAMINECTOMY USING MINIMALLY INVASIVE TECHNIQUE Left 1/12/2023    Procedure: LAMINECTOMY, SPINE, MINIMALLY INVASIVE L L4-5;  Surgeon: Manny Stanley MD;  Location: [facility] OR [address] [place] [address];  Service: Neurosurgery;  Laterality: Left;  [place] table 4 poster, prone, ayesha, emg     Family History    None       Social History     Socioeconomic History    Marital status: Single   Tobacco Use    Smoking status: Never    Smokeless tobacco: Never   Substance and Sexual Activity    Alcohol use: Never    Drug use: Never    Sexual activity: Yes     Partners: Male   Social History Narrative    ** Merged History Encounter **            Review of Systems    OBJECTIVE:     Vital Signs  Pain Score:   6  There is no height or weight on file to calculate BMI.    Neurosurgery Physical Exam    Patient is awake alert appropriate  His cranial nerves are intact    He is 5/5 throughout all muscle groups tested     Normal gait including including heels, toes, and tandem gaits     Diagnostic Results:    MRI scan of lumbar spine 4/26/2022  L 3 L4 minimal mild posterior disc bulge, prominent facet joint and ligamentum flavum hypertrophy with narrowing of lateral recesses, severe spinal and moderate foramen stenosis.     L4-5 moderate posterior disc bulge, posterior central annular fissure without evidence of superimposed central left paracentral disc prolapse, significant compression narrowing lateral recesses particularly on left, prompt facet joint arthropathy, hypertrophy ligamentum flavum, left synovial facet cyst of 2 x 6 mm size, severe spinal and mild moderate foramen stenosis.      MRI Lumbar spine w/wo contrast 4/18/2023  Evidence of previous left L4-5 laminectomy  with enhancing mass extending from disc space on the left concerning for recurrent vascularized L4-5 disc with significant left L4-5 lateral recess  stenosis    ASSESSMENT/PLAN:     58 M with previous L L4-5 microlaminectomy/discectomy on 1/23/23 with significant relief of left radiculopathy, unfortunately had a slip and fall and then again an episode while trying to lift a heavy object, now with new daily bilateral lower extremity radiculopathy from neurogenic claudication, differently from pain prior to surgery that is 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   MRI is concerning for new recurrent disc at left L4-5 with severe lateral recess stenosis.     Overall patient has fairly clear recurrent disc herniation after some heavy lifting immediately postop.  The patient's symptoms his last long enough and failed conservative management.  I think the options are redo diskectomy and fusion versus simple redo diskectomy.  I think given his age the fact he is mainly radiculopathic I think it is reasonable just to do a minimally invasive redo microdiskectomy without fusion stabilization.    I have discussed the risks/benefits, indications, and alternatives for the proposed procedure in detail. I have answered all of their questions and patient wish to proceed with surgery. We will schedule patient.           Note dictated with voice recognition software, please excuse any grammatical errors.

## 2023-04-18 NOTE — H&P (VIEW-ONLY)
Neurosurgery  Established Patient    SUBJECTIVE:     History of Present Illness:  12/20/2022  Patient is here to see me for follow-up after last evaluation patient on 10/11/2022.  This is a 57-year-old male with close to a year history of increasing lower back and bilateral leg pain.  He has signs and symptoms of significant neurogenic claudication would also now has noted some increasing weakness of the left foot.  Patient has treated conservatively for physical therapy without any significant improvement.  He is also had anti-inflammatories gabapentin and muscle relaxants.  He is also had 2 epidural injections.  He is only had temporary relief with epidural injections.  Now he is unable to walk for prolonged period time by normal blocker so.  It is having subtle signs of possible left foot weakness.  He denies any bowel bladder issues.      Interval history 4/18/2023  Since last visit, patient had left L4-L5 microdiscectomy and had immediate relief of left sided radiculopathic pain. He was doing very well after discharge, but then had a slip and fall about 4-5 days after surgery. He again had another incident while trying to lift heavy objects at work in February. After these events, he developed bilateral lower extremity pain, sore/achy pain down the thighs to the calf, some days on the right, some days on the left, worse with standing and walking after some time, better with leaning forward. He notices some weakness in his thighs when he stands up from a squat. The pain is affecting his normal daily life, and he is having to take daily pain medication including a medrol dose kisha he was prescribed in February which provided him with significant relief     Review of patient's allergies indicates:  No Known Allergies    Current Outpatient Medications   Medication Sig Dispense Refill    acetaminophen (TYLENOL) 325 MG tablet Take 325 mg by mouth every 6 (six) hours as needed for Pain.      amlodipine-benazepril  10-20mg (LOTREL) 10-20 mg per capsule Take 1 capsule by mouth once daily.      aspirin 81 MG Chew Take 81 mg by mouth once daily.      atorvastatin (LIPITOR) 10 MG tablet Take 10 mg by mouth nightly.      diclofenac sodium (VOLTAREN) 1 % Gel Apply 2 g topically 3 (three) times daily. 200 g 5    gabapentin (NEURONTIN) 300 MG capsule Take 1 capsule (300 mg total) by mouth 3 (three) times daily. 90 capsule 3    HYDROcodone-acetaminophen (NORCO) 5-325 mg per tablet Take 1 tablet by mouth every 8 (eight) hours as needed for Pain. 40 tablet 0    meloxicam (MOBIC) 15 MG tablet Take 1 tablet (15 mg total) by mouth daily as needed for Pain (back pain). 30 tablet 0    methocarbamoL (ROBAXIN) 750 MG Tab Take 1 tablet (750 mg total) by mouth 4 (four) times daily as needed (muscle spasms). 90 tablet 0    LIDOcaine (LIDODERM) 5 % Place onto the skin once daily. Remove & Discard patch within 12 hours or as directed by MD (Patient not taking: Reported on 4/18/2023) 14 patch 0    methylPREDNISolone (MEDROL DOSEPACK) 4 mg tablet use as directed 21 tablet 0    oxyCODONE-acetaminophen (PERCOCET) 5-325 mg per tablet Take 1 tablet by mouth every 6 (six) hours as needed for Pain. 30 tablet 0    polyethylene glycol (GLYCOLAX) 17 gram PwPk Take 17 g by mouth 2 (two) times daily as needed (constipation). (Patient not taking: Reported on 2/28/2023)  0    traMADoL (ULTRAM) 50 mg tablet Take 50 mg by mouth 3 (three) times daily as needed.       No current facility-administered medications for this visit.       Past Medical History:   Diagnosis Date    Hyperlipidemia     Hypertension      Past Surgical History:   Procedure Laterality Date    EPIDURAL STEROID INJECTION N/A 5/24/2022    Procedure: INJECTION, STEROID, EPIDURAL, L5/S1 IL CONTRAST DIRECT REF;  Surgeon: Angelica Pedraza MD;  Location: [facility] PAIN [facility];  Service: Pain Management;  Laterality: N/A;    EPIDURAL STEROID INJECTION N/A 10/25/2022    Procedure: LUMBAR L5/S1 IL DARIEL CONTRAST  DIRECT REFERRAL;  Surgeon: Angelica Pedraza MD;  [facility];  Service: Pain Management;  Laterality: N/A;    LAMINECTOMY USING MINIMALLY INVASIVE TECHNIQUE Left 1/12/2023    Procedure: LAMINECTOMY, SPINE, MINIMALLY INVASIVE L L4-5;  Surgeon: Manny Stanley MD;  Location: [facility] OR [address] [place] [address];  Service: Neurosurgery;  Laterality: Left;  [place] table 4 poster, prone, ayesha, emg     Family History    None       Social History     Socioeconomic History    Marital status: Single   Tobacco Use    Smoking status: Never    Smokeless tobacco: Never   Substance and Sexual Activity    Alcohol use: Never    Drug use: Never    Sexual activity: Yes     Partners: Male   Social History Narrative    ** Merged History Encounter **            Review of Systems    OBJECTIVE:     Vital Signs  Pain Score:   6  There is no height or weight on file to calculate BMI.    Neurosurgery Physical Exam    Patient is awake alert appropriate  His cranial nerves are intact    He is 5/5 throughout all muscle groups tested     Normal gait including including heels, toes, and tandem gaits     Diagnostic Results:    MRI scan of lumbar spine 4/26/2022  L 3 L4 minimal mild posterior disc bulge, prominent facet joint and ligamentum flavum hypertrophy with narrowing of lateral recesses, severe spinal and moderate foramen stenosis.     L4-5 moderate posterior disc bulge, posterior central annular fissure without evidence of superimposed central left paracentral disc prolapse, significant compression narrowing lateral recesses particularly on left, prompt facet joint arthropathy, hypertrophy ligamentum flavum, left synovial facet cyst of 2 x 6 mm size, severe spinal and mild moderate foramen stenosis.      MRI Lumbar spine w/wo contrast 4/18/2023  Evidence of previous left L4-5 laminectomy  with enhancing mass extending from disc space on the left concerning for recurrent vascularized L4-5 disc with significant left L4-5 lateral recess  stenosis    ASSESSMENT/PLAN:     58 M with previous L L4-5 microlaminectomy/discectomy on 1/23/23 with significant relief of left radiculopathy, unfortunately had a slip and fall and then again an episode while trying to lift a heavy object, now with new daily bilateral lower extremity radiculopathy from neurogenic claudication, differently from pain prior to surgery that is 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   MRI is concerning for new recurrent disc at left L4-5 with severe lateral recess stenosis.     Overall patient has fairly clear recurrent disc herniation after some heavy lifting immediately postop.  The patient's symptoms his last long enough and failed conservative management.  I think the options are redo diskectomy and fusion versus simple redo diskectomy.  I think given his age the fact he is mainly radiculopathic I think it is reasonable just to do a minimally invasive redo microdiskectomy without fusion stabilization.    I have discussed the risks/benefits, indications, and alternatives for the proposed procedure in detail. I have answered all of their questions and patient wish to proceed with surgery. We will schedule patient.           Note dictated with voice recognition software, please excuse any grammatical errors.

## 2023-04-19 ENCOUNTER — PATIENT MESSAGE (OUTPATIENT)
Dept: ADMINISTRATIVE | Facility: OTHER | Age: 58
End: 2023-04-19
Payer: MEDICAID

## 2023-04-20 ENCOUNTER — PATIENT MESSAGE (OUTPATIENT)
Dept: ADMINISTRATIVE | Facility: OTHER | Age: 58
End: 2023-04-20
Payer: MEDICAID

## 2023-04-20 ENCOUNTER — PATIENT MESSAGE (OUTPATIENT)
Dept: NEUROSURGERY | Facility: CLINIC | Age: 58
End: 2023-04-20
Payer: MEDICAID

## 2023-04-24 ENCOUNTER — HOSPITAL ENCOUNTER (OUTPATIENT)
Facility: HOSPITAL | Age: 58
Discharge: HOME | End: 2023-04-24
Attending: NEUROLOGICAL SURGERY | Admitting: NEUROLOGICAL SURGERY
Payer: MEDICAID

## 2023-04-24 ENCOUNTER — ANESTHESIA EVENT (OUTPATIENT)
Dept: SURGERY | Facility: HOSPITAL | Age: 58
End: 2023-04-24
Payer: MEDICAID

## 2023-04-24 ENCOUNTER — ANESTHESIA (OUTPATIENT)
Dept: SURGERY | Facility: HOSPITAL | Age: 58
End: 2023-04-24
Payer: MEDICAID

## 2023-04-24 VITALS
TEMPERATURE: 98 F | RESPIRATION RATE: 20 BRPM | SYSTOLIC BLOOD PRESSURE: 138 MMHG | HEART RATE: 63 BPM | BODY MASS INDEX: 25.1 KG/M2 | DIASTOLIC BLOOD PRESSURE: 82 MMHG | WEIGHT: 170 LBS | OXYGEN SATURATION: 95 %

## 2023-04-24 DIAGNOSIS — M51.26 HNP (HERNIATED NUCLEUS PULPOSUS), LUMBAR: ICD-10-CM

## 2023-04-24 DIAGNOSIS — M54.10 RADICULOPATHY: ICD-10-CM

## 2023-04-24 LAB
ANION GAP SERPL CALC-SCNC: 8 MMOL/L (ref 8–16)
APTT PPP: 28.7 SEC (ref 21–32)
BASOPHILS # BLD AUTO: 0.02 K/UL (ref 0–0.2)
BASOPHILS NFR BLD: 0.3 % (ref 0–1.9)
BUN SERPL-MCNC: 17 MG/DL (ref 6–20)
CALCIUM SERPL-MCNC: 9.2 MG/DL (ref 8.7–10.5)
CHLORIDE SERPL-SCNC: 107 MMOL/L (ref 95–110)
CO2 SERPL-SCNC: 23 MMOL/L (ref 23–29)
CREAT SERPL-MCNC: 0.8 MG/DL (ref 0.5–1.4)
DIFFERENTIAL METHOD: ABNORMAL
EOSINOPHIL # BLD AUTO: 0.2 K/UL (ref 0–0.5)
EOSINOPHIL NFR BLD: 3.5 % (ref 0–8)
ERYTHROCYTE [DISTWIDTH] IN BLOOD BY AUTOMATED COUNT: 12.5 % (ref 11.5–14.5)
EST. GFR  (NO RACE VARIABLE): >60 ML/MIN/1.73 M^2
GLUCOSE SERPL-MCNC: 88 MG/DL (ref 70–110)
HCT VFR BLD AUTO: 41.2 % (ref 40–54)
HGB BLD-MCNC: 13.6 G/DL (ref 14–18)
IMM GRANULOCYTES # BLD AUTO: 0.02 K/UL (ref 0–0.04)
IMM GRANULOCYTES NFR BLD AUTO: 0.3 % (ref 0–0.5)
INR PPP: 1 (ref 0.8–1.2)
LYMPHOCYTES # BLD AUTO: 2 K/UL (ref 1–4.8)
LYMPHOCYTES NFR BLD: 29.8 % (ref 18–48)
MCH RBC QN AUTO: 31.8 PG (ref 27–31)
MCHC RBC AUTO-ENTMCNC: 33 G/DL (ref 32–36)
MCV RBC AUTO: 96 FL (ref 82–98)
MONOCYTES # BLD AUTO: 0.8 K/UL (ref 0.3–1)
MONOCYTES NFR BLD: 11.1 % (ref 4–15)
NEUTROPHILS # BLD AUTO: 3.8 K/UL (ref 1.8–7.7)
NEUTROPHILS NFR BLD: 55 % (ref 38–73)
NRBC BLD-RTO: 0 /100 WBC
PLATELET # BLD AUTO: 212 K/UL (ref 150–450)
PMV BLD AUTO: 10.6 FL (ref 9.2–12.9)
POTASSIUM SERPL-SCNC: 3.9 MMOL/L (ref 3.5–5.1)
PROTHROMBIN TIME: 10.5 SEC (ref 9–12.5)
RBC # BLD AUTO: 4.28 M/UL (ref 4.6–6.2)
SODIUM SERPL-SCNC: 138 MMOL/L (ref 136–145)
WBC # BLD AUTO: 6.84 K/UL (ref 3.9–12.7)

## 2023-04-24 PROCEDURE — 00630 ANES PX LUMBAR REGION NOS: CPT | Performed by: NEUROLOGICAL SURGERY

## 2023-04-24 PROCEDURE — 71000015 HC POSTOP RECOV 1ST HR: Performed by: NEUROLOGICAL SURGERY

## 2023-04-24 PROCEDURE — 88311 DECALCIFY TISSUE: CPT | Performed by: PATHOLOGY

## 2023-04-24 PROCEDURE — 36000710: Performed by: NEUROLOGICAL SURGERY

## 2023-04-24 PROCEDURE — 37000008 HC ANESTHESIA 1ST 15 MINUTES: Performed by: NEUROLOGICAL SURGERY

## 2023-04-24 PROCEDURE — 27201037 HC PRESSURE MONITORING SET UP

## 2023-04-24 PROCEDURE — 25000003 PHARM REV CODE 250: Performed by: STUDENT IN AN ORGANIZED HEALTH CARE EDUCATION/TRAINING PROGRAM

## 2023-04-24 PROCEDURE — 80048 BASIC METABOLIC PNL TOTAL CA: CPT | Performed by: STUDENT IN AN ORGANIZED HEALTH CARE EDUCATION/TRAINING PROGRAM

## 2023-04-24 PROCEDURE — 25000003 PHARM REV CODE 250: Performed by: NURSE ANESTHETIST, CERTIFIED REGISTERED

## 2023-04-24 PROCEDURE — 63600175 PHARM REV CODE 636 W HCPCS: Performed by: NEUROLOGICAL SURGERY

## 2023-04-24 PROCEDURE — 27201423 OPTIME MED/SURG SUP & DEVICES STERILE SUPPLY: Performed by: NEUROLOGICAL SURGERY

## 2023-04-24 PROCEDURE — 63042 PR REDO EXCIS LUMBAR DISK: ICD-10-PCS | Mod: LT,,, | Performed by: NEUROLOGICAL SURGERY

## 2023-04-24 PROCEDURE — 85610 PROTHROMBIN TIME: CPT | Performed by: STUDENT IN AN ORGANIZED HEALTH CARE EDUCATION/TRAINING PROGRAM

## 2023-04-24 PROCEDURE — 88311 DECALCIFY TISSUE: CPT | Mod: 26,,, | Performed by: PATHOLOGY

## 2023-04-24 PROCEDURE — 36000711: Performed by: NEUROLOGICAL SURGERY

## 2023-04-24 PROCEDURE — 88307 TISSUE EXAM BY PATHOLOGIST: CPT | Performed by: PATHOLOGY

## 2023-04-24 PROCEDURE — 63600175 PHARM REV CODE 636 W HCPCS: Performed by: NURSE ANESTHETIST, CERTIFIED REGISTERED

## 2023-04-24 PROCEDURE — D9220A PRA ANESTHESIA: Mod: ANES,,, | Performed by: STUDENT IN AN ORGANIZED HEALTH CARE EDUCATION/TRAINING PROGRAM

## 2023-04-24 PROCEDURE — 63042 LAMINOTOMY SINGLE LUMBAR: CPT | Mod: LT,,, | Performed by: NEUROLOGICAL SURGERY

## 2023-04-24 PROCEDURE — D9220A PRA ANESTHESIA: Mod: CRNA,,, | Performed by: NURSE ANESTHETIST, CERTIFIED REGISTERED

## 2023-04-24 PROCEDURE — 71000016 HC POSTOP RECOV ADDL HR: Performed by: NEUROLOGICAL SURGERY

## 2023-04-24 PROCEDURE — 71000044 HC DOSC ROUTINE RECOVERY FIRST HOUR: Performed by: NEUROLOGICAL SURGERY

## 2023-04-24 PROCEDURE — 88311 PR  DECALCIFY TISSUE: ICD-10-PCS | Mod: 26,,, | Performed by: PATHOLOGY

## 2023-04-24 PROCEDURE — 63600175 PHARM REV CODE 636 W HCPCS: Performed by: STUDENT IN AN ORGANIZED HEALTH CARE EDUCATION/TRAINING PROGRAM

## 2023-04-24 PROCEDURE — 88307 PR  SURG PATH,LEVEL V: ICD-10-PCS | Mod: 26,,, | Performed by: PATHOLOGY

## 2023-04-24 PROCEDURE — 85730 THROMBOPLASTIN TIME PARTIAL: CPT | Performed by: STUDENT IN AN ORGANIZED HEALTH CARE EDUCATION/TRAINING PROGRAM

## 2023-04-24 PROCEDURE — 88307 TISSUE EXAM BY PATHOLOGIST: CPT | Mod: 26,,, | Performed by: PATHOLOGY

## 2023-04-24 PROCEDURE — 37000009 HC ANESTHESIA EA ADD 15 MINS: Performed by: NEUROLOGICAL SURGERY

## 2023-04-24 PROCEDURE — D9220A PRA ANESTHESIA: ICD-10-PCS | Mod: ANES,,, | Performed by: STUDENT IN AN ORGANIZED HEALTH CARE EDUCATION/TRAINING PROGRAM

## 2023-04-24 PROCEDURE — D9220A PRA ANESTHESIA: ICD-10-PCS | Mod: CRNA,,, | Performed by: NURSE ANESTHETIST, CERTIFIED REGISTERED

## 2023-04-24 PROCEDURE — 85025 COMPLETE CBC W/AUTO DIFF WBC: CPT | Performed by: STUDENT IN AN ORGANIZED HEALTH CARE EDUCATION/TRAINING PROGRAM

## 2023-04-24 PROCEDURE — 25000003 PHARM REV CODE 250: Performed by: NEUROLOGICAL SURGERY

## 2023-04-24 RX ORDER — ROCURONIUM BROMIDE 10 MG/ML
INJECTION, SOLUTION INTRAVENOUS
Status: DISCONTINUED | OUTPATIENT
Start: 2023-04-24 | End: 2023-04-24

## 2023-04-24 RX ORDER — LIDOCAINE HYDROCHLORIDE 10 MG/ML
INJECTION, SOLUTION EPIDURAL; INFILTRATION; INTRACAUDAL; PERINEURAL
Status: DISCONTINUED
Start: 2023-04-24 | End: 2023-04-24 | Stop reason: HOSPADM

## 2023-04-24 RX ORDER — METHYLPREDNISOLONE ACETATE 40 MG/ML
INJECTION, SUSPENSION INTRA-ARTICULAR; INTRALESIONAL; INTRAMUSCULAR; SOFT TISSUE
Status: DISCONTINUED | OUTPATIENT
Start: 2023-04-24 | End: 2023-04-24 | Stop reason: HOSPADM

## 2023-04-24 RX ORDER — SODIUM CHLORIDE 0.9 % (FLUSH) 0.9 %
10 SYRINGE (ML) INJECTION
Status: DISCONTINUED | OUTPATIENT
Start: 2023-04-24 | End: 2023-04-24 | Stop reason: HOSPADM

## 2023-04-24 RX ORDER — ONDANSETRON 2 MG/ML
INJECTION INTRAMUSCULAR; INTRAVENOUS
Status: DISCONTINUED | OUTPATIENT
Start: 2023-04-24 | End: 2023-04-24

## 2023-04-24 RX ORDER — KETAMINE HCL IN 0.9 % NACL 50 MG/5 ML
SYRINGE (ML) INTRAVENOUS
Status: DISCONTINUED | OUTPATIENT
Start: 2023-04-24 | End: 2023-04-24

## 2023-04-24 RX ORDER — HALOPERIDOL 5 MG/ML
0.5 INJECTION INTRAMUSCULAR EVERY 10 MIN PRN
Status: DISCONTINUED | OUTPATIENT
Start: 2023-04-24 | End: 2023-04-24 | Stop reason: HOSPADM

## 2023-04-24 RX ORDER — CEPHALEXIN 500 MG/1
500 CAPSULE ORAL EVERY 6 HOURS
Qty: 8 CAPSULE | Refills: 0 | Status: SHIPPED | OUTPATIENT
Start: 2023-04-24 | End: 2023-04-26

## 2023-04-24 RX ORDER — HYDROCODONE BITARTRATE AND ACETAMINOPHEN 10; 325 MG/1; MG/1
1 TABLET ORAL EVERY 4 HOURS PRN
Qty: 30 TABLET | Refills: 0 | Status: SHIPPED | OUTPATIENT
Start: 2023-04-24 | End: 2023-05-01

## 2023-04-24 RX ORDER — MIDAZOLAM HYDROCHLORIDE 1 MG/ML
INJECTION, SOLUTION INTRAMUSCULAR; INTRAVENOUS
Status: DISCONTINUED | OUTPATIENT
Start: 2023-04-24 | End: 2023-04-24

## 2023-04-24 RX ORDER — FENTANYL CITRATE 50 UG/ML
INJECTION, SOLUTION INTRAMUSCULAR; INTRAVENOUS
Status: DISCONTINUED | OUTPATIENT
Start: 2023-04-24 | End: 2023-04-24

## 2023-04-24 RX ORDER — LIDOCAINE HYDROCHLORIDE 20 MG/ML
INJECTION, SOLUTION EPIDURAL; INFILTRATION; INTRACAUDAL; PERINEURAL
Status: DISCONTINUED | OUTPATIENT
Start: 2023-04-24 | End: 2023-04-24

## 2023-04-24 RX ORDER — PROPOFOL 10 MG/ML
VIAL (ML) INTRAVENOUS
Status: DISCONTINUED | OUTPATIENT
Start: 2023-04-24 | End: 2023-04-24

## 2023-04-24 RX ORDER — CEFAZOLIN SODIUM 1 G/3ML
INJECTION, POWDER, FOR SOLUTION INTRAMUSCULAR; INTRAVENOUS
Status: DISCONTINUED | OUTPATIENT
Start: 2023-04-24 | End: 2023-04-24

## 2023-04-24 RX ORDER — DEXAMETHASONE SODIUM PHOSPHATE 4 MG/ML
INJECTION, SOLUTION INTRA-ARTICULAR; INTRALESIONAL; INTRAMUSCULAR; INTRAVENOUS; SOFT TISSUE
Status: DISCONTINUED | OUTPATIENT
Start: 2023-04-24 | End: 2023-04-24

## 2023-04-24 RX ORDER — NEOSTIGMINE METHYLSULFATE 0.5 MG/ML
INJECTION, SOLUTION INTRAVENOUS
Status: DISCONTINUED | OUTPATIENT
Start: 2023-04-24 | End: 2023-04-24

## 2023-04-24 RX ORDER — HYDROCODONE BITARTRATE AND ACETAMINOPHEN 7.5; 325 MG/1; MG/1
1 TABLET ORAL EVERY 6 HOURS PRN
Status: COMPLETED | OUTPATIENT
Start: 2023-04-24 | End: 2023-04-24

## 2023-04-24 RX ORDER — MUPIROCIN 20 MG/G
1 OINTMENT TOPICAL 2 TIMES DAILY
Status: DISCONTINUED | OUTPATIENT
Start: 2023-04-24 | End: 2023-04-24 | Stop reason: HOSPADM

## 2023-04-24 RX ORDER — MUPIROCIN 20 MG/G
OINTMENT TOPICAL
Status: DISCONTINUED | OUTPATIENT
Start: 2023-04-24 | End: 2023-04-24 | Stop reason: HOSPADM

## 2023-04-24 RX ORDER — BUPIVACAINE HYDROCHLORIDE AND EPINEPHRINE 5; 5 MG/ML; UG/ML
INJECTION, SOLUTION EPIDURAL; INTRACAUDAL; PERINEURAL
Status: DISCONTINUED | OUTPATIENT
Start: 2023-04-24 | End: 2023-04-24 | Stop reason: HOSPADM

## 2023-04-24 RX ORDER — FENTANYL CITRATE 50 UG/ML
25 INJECTION, SOLUTION INTRAMUSCULAR; INTRAVENOUS EVERY 5 MIN PRN
Status: DISCONTINUED | OUTPATIENT
Start: 2023-04-24 | End: 2023-04-24 | Stop reason: HOSPADM

## 2023-04-24 RX ADMIN — SODIUM CHLORIDE: 0.9 INJECTION, SOLUTION INTRAVENOUS at 07:04

## 2023-04-24 RX ADMIN — FENTANYL CITRATE 50 MCG: 50 INJECTION, SOLUTION INTRAMUSCULAR; INTRAVENOUS at 07:04

## 2023-04-24 RX ADMIN — MIDAZOLAM HYDROCHLORIDE 2 MG: 2 INJECTION, SOLUTION INTRAMUSCULAR; INTRAVENOUS at 07:04

## 2023-04-24 RX ADMIN — ONDANSETRON 4 MG: 2 INJECTION INTRAMUSCULAR; INTRAVENOUS at 07:04

## 2023-04-24 RX ADMIN — LIDOCAINE HYDROCHLORIDE 100 MG: 20 INJECTION, SOLUTION EPIDURAL; INFILTRATION; INTRACAUDAL at 07:04

## 2023-04-24 RX ADMIN — ROCURONIUM BROMIDE 50 MG: 10 INJECTION, SOLUTION INTRAVENOUS at 07:04

## 2023-04-24 RX ADMIN — DEXAMETHASONE SODIUM PHOSPHATE 8 MG: 4 INJECTION INTRA-ARTICULAR; INTRALESIONAL; INTRAMUSCULAR; INTRAVENOUS; SOFT TISSUE at 07:04

## 2023-04-24 RX ADMIN — GLYCOPYRROLATE 0.4 MG: 0.2 INJECTION, SOLUTION INTRAMUSCULAR; INTRAVENOUS at 09:04

## 2023-04-24 RX ADMIN — FENTANYL CITRATE 25 MCG: 50 INJECTION, SOLUTION INTRAMUSCULAR; INTRAVENOUS at 10:04

## 2023-04-24 RX ADMIN — FENTANYL CITRATE 100 MCG: 50 INJECTION, SOLUTION INTRAMUSCULAR; INTRAVENOUS at 07:04

## 2023-04-24 RX ADMIN — Medication 50 MG: at 07:04

## 2023-04-24 RX ADMIN — NEOSTIGMINE METHYLSULFATE 2 MG: 0.5 INJECTION INTRAVENOUS at 09:04

## 2023-04-24 RX ADMIN — SODIUM CHLORIDE, SODIUM GLUCONATE, SODIUM ACETATE, POTASSIUM CHLORIDE, MAGNESIUM CHLORIDE, SODIUM PHOSPHATE, DIBASIC, AND POTASSIUM PHOSPHATE: .53; .5; .37; .037; .03; .012; .00082 INJECTION, SOLUTION INTRAVENOUS at 08:04

## 2023-04-24 RX ADMIN — MUPIROCIN: 20 OINTMENT TOPICAL at 06:04

## 2023-04-24 RX ADMIN — HYDROCODONE BITARTRATE AND ACETAMINOPHEN 1 TABLET: 7.5; 325 TABLET ORAL at 10:04

## 2023-04-24 RX ADMIN — FENTANYL CITRATE 50 MCG: 50 INJECTION, SOLUTION INTRAMUSCULAR; INTRAVENOUS at 09:04

## 2023-04-24 RX ADMIN — PROPOFOL 150 MG: 10 INJECTION, EMULSION INTRAVENOUS at 07:04

## 2023-04-24 RX ADMIN — CEFAZOLIN 2 G: 1 INJECTION, POWDER, FOR SOLUTION INTRAMUSCULAR; INTRAVENOUS at 07:04

## 2023-04-24 NOTE — TRANSFER OF CARE
Anesthesia Transfer of Care Note    Patient: Juanita Valverde    Procedure(s) Performed: Procedure(s) (LRB):  DISCECTOMY, SPINE, MINIMALLY INVASIVE, USING MICROSCOPE L4-5 (N/A)    Patient location: [facility]    Anesthesia Type: general    Transport from OR: Transported from OR on 6-10 L/min O2 by face mask with adequate spontaneous ventilation    Post pain: adequate analgesia    Post assessment: no apparent anesthetic complications    Post vital signs: stable    Level of consciousness: awake    Nausea/Vomiting: no nausea/vomiting    Complications: none    Transfer of care protocol was followed      Last vitals:   Visit Vitals  BP (!) 153/77 (BP Location: Right arm, Patient Position: Lying)   Pulse 83   Temp 36.5 °C (97.7 °F) (Temporal)   Resp 20   Wt 77.1 kg (170 lb)   SpO2 100%   BMI 25.10 kg/m²

## 2023-04-24 NOTE — DISCHARGE INSTRUCTIONS
DISCHARGE INSTRUCTIONS:      Wound care:     Keep incisions dry. Do not soak under water (bathtub, swimming pool, etc.). Please shower with baby shampoo, but do not take a bath. If the incision becomes wet, gently pat it dry with a clean towel; do not rub.     You have skin glue over your incision. Please do not pick at it or try to remove it. It will dissolve on its own.     Other post-op instructions:     No lifting anything heavier than a gallon of milk until cleared in post-operative visit.

## 2023-04-24 NOTE — PLAN OF CARE
Chart reviewed. Preop nursing care completed per orders. Safe surgery checklist complete. Ordered bloodwork (CBC, BMP, PT/PTT/INR) delivered to lab. Friend at bedside and to take belongings. Confirmed with pt that friend is staying with him tonight after surgery. Waiting for H&P update, site fabián, and anesthesia consent prior to surgery. Call bell within reach. Instructed pt to call for assistance.

## 2023-04-24 NOTE — ANESTHESIA PROCEDURE NOTES
Intubation    Date/Time: 4/24/2023 7:12 AM  Performed by: Roxana Aquino CRNA  Authorized by: Thompson Zhou MD     Intubation:     Induction:  Intravenous    Intubated:  Postinduction    Mask Ventilation:  Easy mask    Attempts:  1    Attempted By:  CRNA    Method of Intubation:  Video laryngoscopy    Blade:  Gonzales 3    Laryngeal View Grade: Grade I - full view of cords      Difficult Airway Encountered?: No      Complications:  None    Airway Device:  Oral endotracheal tube    Airway Device Size:  7.5    Style/Cuff Inflation:  Cuffed (inflated to minimal occlusive pressure)    Inflation Amount (mL):  8    Tube secured:  22    Secured at:  The lips    Placement Verified By:  Capnometry    Complicating Factors:  None    Findings Post-Intubation:  BS equal bilateral and atraumatic/condition of teeth unchanged

## 2023-04-24 NOTE — ANESTHESIA PREPROCEDURE EVALUATION
04/24/2023  Juanita Valverde is a 58 y.o., male.  Pre-operative evaluation for Procedure(s) (LRB):  DISCECTOMY, SPINE, MINIMALLY INVASIVE, USING MICROSCOPE L4-5 (N/A)    Juanita Valverde is a 58 y.o. male     Patient Active Problem List   Diagnosis    HNP (herniated nucleus pulposus), lumbar       Review of patient's allergies indicates:  No Known Allergies    No current facility-administered medications on file prior to encounter.     Current Outpatient Medications on File Prior to Encounter   Medication Sig Dispense Refill    amlodipine-benazepril 10-20mg (LOTREL) 10-20 mg per capsule Take 1 capsule by mouth once daily.      aspirin 81 MG Chew Take 81 mg by mouth once daily.      atorvastatin (LIPITOR) 10 MG tablet Take 10 mg by mouth nightly.      diclofenac sodium (VOLTAREN) 1 % Gel Apply 2 g topically 3 (three) times daily. 200 g 5    gabapentin (NEURONTIN) 300 MG capsule Take 1 capsule (300 mg total) by mouth 3 (three) times daily. 90 capsule 3    HYDROcodone-acetaminophen (NORCO) 5-325 mg per tablet Take 1 tablet by mouth every 8 (eight) hours as needed for Pain. 40 tablet 0    LIDOcaine (LIDODERM) 5 % Place onto the skin once daily. Remove & Discard patch within 12 hours or as directed by MD 14 patch 0    oxyCODONE-acetaminophen (PERCOCET) 5-325 mg per tablet Take 1 tablet by mouth every 6 (six) hours as needed for Pain. 30 tablet 0    acetaminophen (TYLENOL) 325 MG tablet Take 325 mg by mouth every 6 (six) hours as needed for Pain.      meloxicam (MOBIC) 15 MG tablet Take 1 tablet (15 mg total) by mouth daily as needed for Pain (back pain). 30 tablet 0    methocarbamoL (ROBAXIN) 750 MG Tab Take 1 tablet (750 mg total) by mouth 4 (four) times daily as needed (muscle spasms). 90 tablet 0    traMADoL (ULTRAM) 50 mg tablet Take 50 mg by mouth 3 (three) times daily as needed.         Past Surgical History:    Procedure Laterality Date    EPIDURAL STEROID INJECTION N/A 2022    Procedure: INJECTION, STEROID, EPIDURAL, L5/S1 IL CONTRAST DIRECT REF;  Surgeon: Angelica Pedraza MD;  [facility];  Service: Pain Management;  Laterality: N/A;    EPIDURAL STEROID INJECTION N/A 10/25/2022    Procedure: LUMBAR L5/S1 IL DARIEL CONTRAST DIRECT REFERRAL;  Surgeon: Angelica Pedraza MD;  [facility]: [facility];  Service: Pain Management;  Laterality: N/A;    LAMINECTOMY USING MINIMALLY INVASIVE TECHNIQUE Left 2023    Procedure: LAMINECTOMY, SPINE, MINIMALLY INVASIVE L L4-5;  Surgeon: Manny Stanley MD;  [facility];  Service: Neurosurgery;  Laterality: Left;  [place] table 4 poster, prone, ayesha, emg       Social History     Socioeconomic History    Marital status:    Tobacco Use    Smoking status: Never    Smokeless tobacco: Never   Substance and Sexual Activity    Alcohol use: Never    Drug use: Never    Sexual activity: Yes     Partners: Male   Social History Narrative    ** Merged History Encounter **              CBC: No results for input(s): WBC, RBC, HGB, HCT, PLT, MCV, MCH, MCHC in the last 72 hours.    CMP: No results for input(s): NA, K, CL, CO2, BUN, CREATININE, GLU, MG, PHOS, CALCIUM, ALBUMIN, PROT, ALKPHOS, ALT, AST, BILITOT in the last 72 hours.    INR  No results for input(s): PT, INR, PROTIME, APTT in the last 72 hours.        Diagnostic Studies:      EKD Echo:  No results found for this or any previous visit.        Pre-op Assessment    I have reviewed the Patient Summary Reports.     I have reviewed the Nursing Notes. I have reviewed the NPO Status.      Review of Systems  Cardiovascular:   Hypertension        Physical Exam    Airway:  Mallampati: II           Anesthesia Plan  Type of Anesthesia, risks & benefits discussed:    Anesthesia Type: Gen ETT  Intra-op Monitoring Plan: Standard ASA Monitors  Post Op Pain Control Plan: multimodal  analgesia  Induction:  IV  Airway Plan: Direct, Post-Induction  Informed Consent: Informed consent signed with the Patient and all parties understand the risks and agree with anesthesia plan.  All questions answered.   ASA Score: 2  Day of Surgery Review of History & Physical: H&P Update referred to the surgeon/provider.    Ready For Surgery From Anesthesia Perspective.     .

## 2023-04-24 NOTE — BRIEF OP NOTE
[facility] ([facility])  Brief Operative Note    SUMMARY     Surgery Date: 4/24/2023     Surgeon(s) and Role:     * Manny Stanley MD - Primary     * Dennis Hickey MD - Resident - Assisting      Pre-op Diagnosis:  Lumbar radiculopathy [M54.16]  HNP (herniated nucleus pulposus), lumbar [M51.26]    Post-op Diagnosis:  Post-Op Diagnosis Codes:     * Lumbar radiculopathy [M54.16]     * HNP (herniated nucleus pulposus), lumbar [M51.26]    Procedure(s) (LRB):  DISCECTOMY, SPINE, MINIMALLY INVASIVE, USING MICROSCOPE L4-5 (N/A)    Anesthesia: General    Operative Findings:  good discectomy      Estimated Blood Loss: 10cc          Specimens:   Specimen (24h ago, onward)       Start     Ordered    04/24/23 0849  Specimen to Pathology, Surgery Neurosurgery  Once        Comments: Pre-op Diagnosis: Lumbar radiculopathy [M54.16]HNP (herniated nucleus pulposus), lumbar [M51.26]Procedure(s):DISCECTOMY, SPINE, MINIMALLY INVASIVE, USING MICROSCOPE L4-5 Number of specimens: 1Name of specimens: Disk Material L4-5     References:    Click here for ordering Quick Tip   Question Answer Comment   Procedure Type: Neurosurgery    Specimen Class: Routine/Screening    Which provider would you like to cc? MANNY STANLEY    Release to patient Immediate        04/24/23 0848                    [id number]

## 2023-04-24 NOTE — PLAN OF CARE
Discharge instructions reviewed with patient and friend at bedside. Verbalized understanding. Packet given. Medication delivered to bedside prior to discharge.

## 2023-04-24 NOTE — DISCHARGE SUMMARY
[facility] - Surgery (2nd Fl)  Discharge Note  Short Stay    Procedure(s) (LRB):  DISCECTOMY, SPINE, MINIMALLY INVASIVE, USING MICROSCOPE L4-5 (N/A)      OUTCOME: Condition has improved and patient is now ready for discharge.    DISPOSITION: Home or Self Care    FINAL DIAGNOSIS:  recurrent discectomy     FOLLOWUP: In clinic    DISCHARGE INSTRUCTIONS:     Wound care:    Keep incisions dry. Do not soak under water (bathtub, swimming pool, etc.). Please shower with baby shampoo, but do not take a bath. If the incision becomes wet, gently pat it dry with a clean towel; do not rub.    You have skin glue over your incision. Please do not pick at it or try to remove it. It will dissolve on its own.    Other post-op instructions:    No lifting anything heavier than a gallon of milk until cleared in post-operative visit.

## 2023-04-25 NOTE — ANESTHESIA POSTPROCEDURE EVALUATION
Anesthesia Post Evaluation    Patient: Juanita Valverde    Procedure(s) Performed: Procedure(s) (LRB):  DISCECTOMY, SPINE, MINIMALLY INVASIVE, USING MICROSCOPE L4-5 (N/A)    Final Anesthesia Type: general      Patient location during evaluation: PACU  Patient participation: Yes- Able to Participate  Level of consciousness: awake and alert, awake and oriented  Post-procedure vital signs: reviewed and stable  Pain management: adequate  Airway patency: patent    PONV status at discharge: No PONV  Anesthetic complications: no      Cardiovascular status: blood pressure returned to baseline, hemodynamically stable and stable  Respiratory status: unassisted, spontaneous ventilation and room air  Hydration status: euvolemic  Follow-up not needed.          Vitals Value Taken Time   /82 04/24/23 1202   Temp 36.5 °C (97.7 °F) 04/24/23 1200   Pulse 68 04/24/23 1211   Resp 23 04/24/23 1206   SpO2 95 % 04/24/23 1211   Vitals shown include unvalidated device data.      No case tracking events are documented in the log.      Pain/Petr Score: Pain Rating Prior to Med Admin: 5 (4/24/2023 10:40 AM)  Pain Rating Post Med Admin: 3 (4/24/2023 12:27 PM)  Petr Score: 10 (4/24/2023  9:45 AM)

## 2023-04-27 NOTE — OP NOTE
[facility] - Surgery ([facility])  Neurosurgery  Operative Note    OP Note      Date of Procedure: 4/24/2023       Pre-Operative Diagnosis: Lumbar radiculopathy [M54.16]  HNP (herniated nucleus pulposus), lumbar [M51.26]    Post-Operative Diagnosis: Post-Op Diagnosis Codes:     * Lumbar radiculopathy [M54.16]     * HNP (herniated nucleus pulposus), lumbar [M51.26]    Anesthesia: General    Procedures performed:1) a redo left L4-5 hemilaminotomy and microdiskectomy 2. Use of microsurgical technique     Surgeon: Manny Stanley MD    Assistant:: Dennis Hickey MD    Indication for Procedure:  This is a 58-year-old male who had a previous L4-5 microdiskectomy but it we herniated disc now presents with intractable radiculopathy.    Operative Note:  Patient was anesthetized intubated by anesthesia.  Placed placed in the prone position.  Back was prepped and draped sterile fashion.  We reopened the previous incision.  We used a 18 mm tube we dilated up to 18 mm 5. Tube.  We parted under fluoroscopy over the medial aspect of the facet joint and the previous laminotomy.  We brought in microscope and microsurgical technique we removed the soft tissue and then slowly dissected the scar using micro dissection to be able to get to the lateral edge of the previous laminotomy.  Once we we were able to identify that we used high-speed drill to perform a medial facetectomy.  Going this we went down and got into the scarring got into the should disc.  We are able to remove the microdiskectomy in the disc space removed the recurrent disc fragment.  Once were happy with the diskectomy and confirmed that the thecal sac with decompression of the neural foramen decompressed we irrigated out the wound placed some FloSeal placed some Depo-Medrol and then we removed the metrics tube.  The wound was then closed in layers.  Neuro monitor was stable throughout the whole case.    EBL:  Minimal  Specimen Sent:  Recurrent disc

## 2023-04-28 LAB
FINAL PATHOLOGIC DIAGNOSIS: NORMAL
GROSS: NORMAL
Lab: NORMAL

## 2023-05-08 ENCOUNTER — CLINICAL SUPPORT (OUTPATIENT)
Dept: NEUROSURGERY | Facility: CLINIC | Age: 58
End: 2023-05-08
Payer: MEDICAID

## 2023-05-08 RX ORDER — METHOCARBAMOL 500 MG/1
500 TABLET, FILM COATED ORAL 4 TIMES DAILY
Qty: 40 TABLET | Refills: 0 | Status: SHIPPED | OUTPATIENT
Start: 2023-05-08 | End: 2023-05-18

## 2023-05-09 ENCOUNTER — PATIENT MESSAGE (OUTPATIENT)
Dept: NEUROSURGERY | Facility: CLINIC | Age: 58
End: 2023-05-09
Payer: MEDICAID

## 2023-05-10 ENCOUNTER — CLINICAL SUPPORT (OUTPATIENT)
Dept: NEUROSURGERY | Facility: CLINIC | Age: 58
End: 2023-05-10
Payer: MEDICAID

## 2023-05-10 VITALS — TEMPERATURE: 97 F

## 2023-05-10 NOTE — PROGRESS NOTES
Patient seen via video visit  for 2 week post op s/p L4-5 MIS discectomy with Dr Stanley 04/24/2023.                      Incision on lumbar spine assessed, dissolvable sutures used for closure,  no redness, some swelling, or no drainage, edges well approximated. Incision is dark in color with what looks to be scabbing.     Patient was instructed as follows:   Discontinue Bacitracin after tonight.  May shower normally but pat dry after shower.  Do not submerge wound in bath tub or go swimming until released by the physician  Keep incision clean, dry and open to air as much as possible.  Patient encouraged to walk as much as possible but advised to walk with family member or friend and rest as necessary.  No lifting >10lbs.  Avoid bending and twisting the area of your surgery more than 45 degrees from neutral position in any direction.      All questions were answered. Patient will follow up with Jules Love PA-C 06/06/2023 . Patient was encouraged to call clinic with any future concerns prior to follow up appt. If any worsening symptoms, patient should report to ED.       Giovanna Akhtar, MSN, RN  Neurosurgery Nurse Navigator

## 2023-05-10 NOTE — PROGRESS NOTES
Patient seen in clinic for 2 week post op s/p L4-5 MIS discectomy with Dr Stanley 04/24/2023. Patient came in for DR Stanley to assess incision.        Incision on lumbar spine assessed, dissolvable sutures used for closure, no redness, little swelling, no drainage, edges well approximated.     Patient was instructed as follows:   Start bacitracin once a day for 3 days   May shower normally but pat dry after shower.  Do not submerge wound in bath tub or go swimming until released by the physician  Keep incision clean, dry and open to air as much as possible.  Patient encouraged to walk as much as possible but advised to walk with family member or friend and rest as necessary.  No lifting >10lbs.  Avoid bending and twisting the area of your surgery more than 45 degrees from neutral position in any direction.    Per DR Stanley incision looks good, the little swelling should absorb. Patient is not having increased pain or LE weakness, just muscle spasms mostly at night that we rx Robaxin for and he claims they do help. Mr. Valverde will send me another picture Monday to track progress of healing.     All questions were answered. Patient will follow up with Jules Love PA-C 06/06/2023. Patient was encouraged to call clinic with any future concerns prior to follow up appt. If any worsening symptoms, patient should report to ED.       Giovanna Akhtar, MSN, RN  Neurosurgery Nurse Navigator

## 2023-05-22 ENCOUNTER — PATIENT MESSAGE (OUTPATIENT)
Dept: ADMINISTRATIVE | Facility: OTHER | Age: 58
End: 2023-05-22
Payer: MEDICAID

## 2023-06-06 ENCOUNTER — OFFICE VISIT (OUTPATIENT)
Dept: NEUROSURGERY | Facility: CLINIC | Age: 58
End: 2023-06-06
Payer: MEDICAID

## 2023-06-06 VITALS
HEIGHT: 69 IN | BODY MASS INDEX: 25.18 KG/M2 | SYSTOLIC BLOOD PRESSURE: 129 MMHG | WEIGHT: 170 LBS | DIASTOLIC BLOOD PRESSURE: 80 MMHG | HEART RATE: 61 BPM

## 2023-06-06 DIAGNOSIS — M54.16 LUMBAR RADICULOPATHY: Primary | ICD-10-CM

## 2023-06-06 DIAGNOSIS — Z98.890 S/P LAMINECTOMY: ICD-10-CM

## 2023-06-06 PROCEDURE — 3008F PR BODY MASS INDEX (BMI) DOCUMENTED: ICD-10-PCS | Mod: CPTII,,, | Performed by: PHYSICIAN ASSISTANT

## 2023-06-06 PROCEDURE — 1160F PR REVIEW ALL MEDS BY PRESCRIBER/CLIN PHARMACIST DOCUMENTED: ICD-10-PCS | Mod: CPTII,,, | Performed by: PHYSICIAN ASSISTANT

## 2023-06-06 PROCEDURE — 99999 PR PBB SHADOW E&M-EST. PATIENT-LVL III: CPT | Mod: PBBFAC,,, | Performed by: PHYSICIAN ASSISTANT

## 2023-06-06 PROCEDURE — 3008F BODY MASS INDEX DOCD: CPT | Mod: CPTII,,, | Performed by: PHYSICIAN ASSISTANT

## 2023-06-06 PROCEDURE — 99213 OFFICE O/P EST LOW 20 MIN: CPT | Mod: PBBFAC | Performed by: PHYSICIAN ASSISTANT

## 2023-06-06 PROCEDURE — 1159F MED LIST DOCD IN RCRD: CPT | Mod: CPTII,,, | Performed by: PHYSICIAN ASSISTANT

## 2023-06-06 PROCEDURE — 1159F PR MEDICATION LIST DOCUMENTED IN MEDICAL RECORD: ICD-10-PCS | Mod: CPTII,,, | Performed by: PHYSICIAN ASSISTANT

## 2023-06-06 PROCEDURE — 3079F DIAST BP 80-89 MM HG: CPT | Mod: CPTII,,, | Performed by: PHYSICIAN ASSISTANT

## 2023-06-06 PROCEDURE — 99024 POSTOP FOLLOW-UP VISIT: CPT | Mod: ,,, | Performed by: PHYSICIAN ASSISTANT

## 2023-06-06 PROCEDURE — 4010F PR ACE/ARB THEARPY RXD/TAKEN: ICD-10-PCS | Mod: CPTII,,, | Performed by: PHYSICIAN ASSISTANT

## 2023-06-06 PROCEDURE — 1160F RVW MEDS BY RX/DR IN RCRD: CPT | Mod: CPTII,,, | Performed by: PHYSICIAN ASSISTANT

## 2023-06-06 PROCEDURE — 4010F ACE/ARB THERAPY RXD/TAKEN: CPT | Mod: CPTII,,, | Performed by: PHYSICIAN ASSISTANT

## 2023-06-06 PROCEDURE — 99999 PR PBB SHADOW E&M-EST. PATIENT-LVL III: ICD-10-PCS | Mod: PBBFAC,,, | Performed by: PHYSICIAN ASSISTANT

## 2023-06-06 PROCEDURE — 99024 PR POST-OP FOLLOW-UP VISIT: ICD-10-PCS | Mod: ,,, | Performed by: PHYSICIAN ASSISTANT

## 2023-06-06 PROCEDURE — 3074F SYST BP LT 130 MM HG: CPT | Mod: CPTII,,, | Performed by: PHYSICIAN ASSISTANT

## 2023-06-06 PROCEDURE — 3079F PR MOST RECENT DIASTOLIC BLOOD PRESSURE 80-89 MM HG: ICD-10-PCS | Mod: CPTII,,, | Performed by: PHYSICIAN ASSISTANT

## 2023-06-06 PROCEDURE — 3074F PR MOST RECENT SYSTOLIC BLOOD PRESSURE < 130 MM HG: ICD-10-PCS | Mod: CPTII,,, | Performed by: PHYSICIAN ASSISTANT

## 2023-06-06 RX ORDER — METHOCARBAMOL 750 MG/1
750 TABLET, FILM COATED ORAL 4 TIMES DAILY PRN
Qty: 60 TABLET | Refills: 0 | Status: SHIPPED | OUTPATIENT
Start: 2023-06-06

## 2023-06-06 RX ORDER — GABAPENTIN 300 MG/1
300 CAPSULE ORAL 3 TIMES DAILY
Qty: 90 CAPSULE | Refills: 3 | Status: SHIPPED | OUTPATIENT
Start: 2023-06-06

## 2023-06-06 RX ORDER — MELOXICAM 15 MG/1
15 TABLET ORAL DAILY PRN
Qty: 30 TABLET | Refills: 0 | Status: SHIPPED | OUTPATIENT
Start: 2023-06-06

## 2023-06-06 NOTE — PROGRESS NOTES
Neurosurgery  Established Patient    SUBJECTIVE:     History of Present Illness:  Mr. Valverde is here today for postoperative follow-up, he is approximately 6 weeks status post MIS laminectomy microdiskectomy at L4-5.  Overall he reports significant improvement in both pain numbness and strength.  He has been ambulating well on his own.  He has occasional back pain but overall that is improving.  Patient has weaned off pain medication he is still taking gabapentin and anti-inflammatories and muscle relaxers as needed.  Denies fevers/chills. Denies  weakness in extremities.  Denies bowel/bladder dysfunction.        Review of patient's allergies indicates:  No Known Allergies    Current Outpatient Medications   Medication Sig Dispense Refill    amlodipine-benazepril 10-20mg (LOTREL) 10-20 mg per capsule Take 1 capsule by mouth once daily.      atorvastatin (LIPITOR) 10 MG tablet Take 10 mg by mouth nightly.      gabapentin (NEURONTIN) 300 MG capsule Take 1 capsule (300 mg total) by mouth 3 (three) times daily. 90 capsule 3    meloxicam (MOBIC) 15 MG tablet Take 1 tablet (15 mg total) by mouth daily as needed for Pain (back pain). 30 tablet 0    methocarbamoL (ROBAXIN) 750 MG Tab Take 1 tablet (750 mg total) by mouth 4 (four) times daily as needed (muscle spasms). 60 tablet 0     No current facility-administered medications for this visit.       Past Medical History:   Diagnosis Date    Hyperlipidemia     Hypertension      Past Surgical History:   Procedure Laterality Date    EPIDURAL STEROID INJECTION N/A 5/24/2022    Procedure: INJECTION, STEROID, EPIDURAL, L5/S1 IL CONTRAST DIRECT REF;  Surgeon: Angelica Pedraza MD;  Location: [facility] PAIN MGT;  Service: Pain Management;  Laterality: N/A;    EPIDURAL STEROID INJECTION N/A 10/25/2022    Procedure: LUMBAR L5/S1 IL DARIEL CONTRAST DIRECT REFERRAL;  Surgeon: Angelica Pedraza MD;  Location: [facility] PAIN [facility]GT;  Service: Pain Management;  Laterality: N/A;    LAMINECTOMY USING  "MINIMALLY INVASIVE TECHNIQUE Left 1/12/2023    Procedure: LAMINECTOMY, SPINE, MINIMALLY INVASIVE L L4-5;  Surgeon: Manny Stanley MD;  Location: [facility] OR [address] [place] [address];  Service: Neurosurgery;  Laterality: Left;  Bard table 4 poster, prone, ayesha, emg    MINIMALLY INVASIVE SURGICAL REMOVAL OF INTERVERTEBRAL DISC OF SPINE USING MICROSCOPE N/A 4/24/2023    Procedure: DISCECTOMY, SPINE, MINIMALLY INVASIVE, USING MICROSCOPE L4-5;  Surgeon: Manny Stanley MD;  Location: [facility] OR [address] [place] [address];  Service: Neurosurgery;  Laterality: N/A;  Bard table 4 poster, prone, neuromonitoring, ayesha     Family History    None       Social History     Socioeconomic History    Marital status:    Tobacco Use    Smoking status: Never    Smokeless tobacco: Never   Substance and Sexual Activity    Alcohol use: Never    Drug use: Never    Sexual activity: Yes     Partners: Male   Social History Narrative    ** Merged History Encounter **            Review of Systems  Constitutional: no fever or chills  Eyes: no visual changes  ENT: no nasal congestion or sore throat  Respiratory: no cough or shortness of breath  Cardiovascular: no chest pain or palpitations  Gastrointestinal: no nausea or vomiting  Genitourinary: no hematuria or dysuria  Integument/Breast: no rash or pruritis  Hematologic/Lymphatic: no easy bruising or lymphadenopathy  Musculoskeletal: no arthralgias or myalgias  Neurological: no seizures or tremors    OBJECTIVE:     Vital Signs  Pulse: 61  BP: 129/80  Pain Score:   2  Height: 5' 9" (175.3 cm)  Weight: 77.1 kg (170 lb)  Body mass index is 25.1 kg/m².      Neurosurgery Physical Exam   General: no distress  Neurologic: Alert and oriented. Thought content appropriate.  Head: normocephalic  GCS: Motor: 6/Verbal: 5/Eyes: 4 GCS Total: 15  Cranial nerves: face symmetric, tongue midline, pupils equal, round, reactive to light with accomodation, extraocular muscles intact  Sensory: response to light touch throughout  Motor Strength:full " strength upper and lower extremities  Back - incision well healed  Gait normal      Diagnostic Results: personally reviewed  No new studies    ASSESSMENT/PLAN:     57 yo male 6 weeks status post MIS laminectomy microdiskectomy at L4-5, patient with significant improvement in both back and lower extremity pain and strength.  He is neurologically intact without any focal deficits.  Is off the pain medication.  Refilled Robaxin, gabapentin, and Mobic.  Discussed advancing activity level.  Will plan on seeing him back in 3 months, if he is any questions or concerns the meantime he is encouraged to give us a call.      Jules Love Jr. PAAna MC  [facility]  Department of Neurosurgery     Note dictated with voice recognition software, please excuse any grammatical errors.

## 2023-10-04 ENCOUNTER — HOSPITAL ENCOUNTER (OUTPATIENT)
Dept: RADIOLOGY | Facility: HOSPITAL | Age: 58
Discharge: HOME | End: 2023-10-04
Attending: STUDENT IN AN ORGANIZED HEALTH CARE EDUCATION/TRAINING PROGRAM
Payer: MEDICAID

## 2023-10-04 DIAGNOSIS — M54.16 LUMBAR RADICULOPATHY: ICD-10-CM

## 2023-10-04 PROCEDURE — 72114 X-RAY EXAM L-S SPINE BENDING: CPT | Mod: 26,,, | Performed by: RADIOLOGY

## 2023-10-04 PROCEDURE — 72114 X-RAY EXAM L-S SPINE BENDING: CPT | Mod: TC,FY

## 2023-10-04 PROCEDURE — 72114 XR LUMBAR SPINE 5 VIEW WITH FLEX AND EXT: ICD-10-PCS | Mod: 26,,, | Performed by: RADIOLOGY

## (undated) DEVICE — CLIPPER BLADE MOD 4406 (CAREF)

## (undated) DEVICE — DRAPE ABDOMINAL TIBURON 14X11

## (undated) DEVICE — CATH SUCTION 10FR

## (undated) DEVICE — SUT MCRYL PLUS 4-0 PS2 27IN

## (undated) DEVICE — HEMOSTAT SURGICEL 4X8IN

## (undated) DEVICE — BUR BONE CUT MICRO TPS 3X3.8MM

## (undated) DEVICE — DRAPE INCISE IOBAN 2 23X17IN

## (undated) DEVICE — TUBE FRAZIER 5MM 2FT SOFT TIP

## (undated) DEVICE — GAUZE SPONGE 4X4 12PLY

## (undated) DEVICE — SUT VICRYL PLUS 3-0 SH 18IN

## (undated) DEVICE — DRAPE STERI INSTRUMENT 1018

## (undated) DEVICE — DRESSING LEUKOPLAST FLEX 1X3IN

## (undated) DEVICE — DRESSING AQUACEL SACRAL 9 X 9

## (undated) DEVICE — SUT VICRYL+ 27 UR-6 VIOL

## (undated) DEVICE — ADHESIVE DERMABOND ADVANCED

## (undated) DEVICE — DRAPE OPMI STERILE

## (undated) DEVICE — CARTRIDGE OIL

## (undated) DEVICE — DURAPREP SURG SCRUB 26ML

## (undated) DEVICE — NDL HYPO REG 25G X 1 1/2

## (undated) DEVICE — DRESSING AQUACEL FOAM 5 X 5

## (undated) DEVICE — DRAPE C-ARMOR EQUIPMENT COVER

## (undated) DEVICE — BLADE ELECTRO EDGE INSULATED

## (undated) DEVICE — STAPLER SKIN PROXIMATE WIDE

## (undated) DEVICE — SUT D SPECIAL VICRYL 2-0

## (undated) DEVICE — SUT 0 VICRYL / UR6 (J603)

## (undated) DEVICE — ELECTRODE REM PLYHSV RETURN 9

## (undated) DEVICE — DRESSING ABSRBNT ISLAND 3.6X8

## (undated) DEVICE — DRESSING SURGICAL 1/2X1/2

## (undated) DEVICE — NDL SPINAL 18GX3.5 SPINOCAN

## (undated) DEVICE — DRAPE STERI-DRAPE 1000 17X11IN

## (undated) DEVICE — DRAPE TOP 53X102IN

## (undated) DEVICE — DRESSING MEPILEX BORDER 4 X 4

## (undated) DEVICE — DIFFUSER

## (undated) DEVICE — TRAY CATH FOL SIL URIMTR 16FR

## (undated) DEVICE — CORD BIPOLAR 12 FOOT

## (undated) DEVICE — DRAPE C-ARM ELAS CLIP 42X120IN

## (undated) DEVICE — KIT SPINAL PATIENT CARE JACK

## (undated) DEVICE — MARKER SKIN STND TIP BLUE BARR

## (undated) DEVICE — KIT SURGIFLO HEMOSTATIC MATRIX

## (undated) DEVICE — SEE MEDLINE ITEM 156905

## (undated) DEVICE — HEMOSTAT SURGICEL 2X3IN